# Patient Record
Sex: MALE | Race: AMERICAN INDIAN OR ALASKA NATIVE
[De-identification: names, ages, dates, MRNs, and addresses within clinical notes are randomized per-mention and may not be internally consistent; named-entity substitution may affect disease eponyms.]

---

## 2019-10-22 ENCOUNTER — HOSPITAL ENCOUNTER (INPATIENT)
Dept: HOSPITAL 5 - ED | Age: 34
LOS: 2 days | Discharge: HOME | DRG: 391 | End: 2019-10-24
Attending: INTERNAL MEDICINE | Admitting: INTERNAL MEDICINE
Payer: COMMERCIAL

## 2019-10-22 DIAGNOSIS — F17.210: ICD-10-CM

## 2019-10-22 DIAGNOSIS — E86.0: ICD-10-CM

## 2019-10-22 DIAGNOSIS — R19.7: Primary | ICD-10-CM

## 2019-10-22 DIAGNOSIS — N17.0: ICD-10-CM

## 2019-10-22 DIAGNOSIS — N39.0: ICD-10-CM

## 2019-10-22 DIAGNOSIS — E87.2: ICD-10-CM

## 2019-10-22 DIAGNOSIS — E87.1: ICD-10-CM

## 2019-10-22 DIAGNOSIS — B20: ICD-10-CM

## 2019-10-22 LAB
ALBUMIN SERPL-MCNC: 3.2 G/DL (ref 3.9–5)
ALT SERPL-CCNC: 17 UNITS/L (ref 7–56)
ANISOCYTOSIS BLD QL SMEAR: (no result)
BAND NEUTROPHILS # (MANUAL): 0 K/MM3
BILIRUB UR QL STRIP: (no result)
BLOOD UR QL VISUAL: (no result)
BUN SERPL-MCNC: 47 MG/DL (ref 9–20)
BUN/CREAT SERPL: 28 %
CALCIUM SERPL-MCNC: 8.5 MG/DL (ref 8.4–10.2)
HCT VFR BLD CALC: 48.6 % (ref 35.5–45.6)
HEMOLYSIS INDEX: 176
HGB BLD-MCNC: 15.9 GM/DL (ref 11.8–15.2)
INR PPP: 0.98 (ref 0.87–1.13)
MCHC RBC AUTO-ENTMCNC: 33 % (ref 32–34)
MCV RBC AUTO: 85 FL (ref 84–94)
MYELOCYTES # (MANUAL): 0 K/MM3
PH UR STRIP: 6 [PH] (ref 5–7)
PLATELET # BLD: 193 K/MM3 (ref 140–440)
POIKILOCYTOSIS BLD QL SMEAR: (no result)
PROMYELOCYTES # (MANUAL): 0 K/MM3
RBC # BLD AUTO: 5.74 M/MM3 (ref 3.65–5.03)
RBC #/AREA URNS HPF: 5 /HPF (ref 0–6)
TOTAL CELLS COUNTED BLD: 100
UROBILINOGEN UR-MCNC: < 2 MG/DL (ref ?–2)
WBC #/AREA URNS HPF: 10 /HPF (ref 0–6)

## 2019-10-22 PROCEDURE — 36415 COLL VENOUS BLD VENIPUNCTURE: CPT

## 2019-10-22 PROCEDURE — 87177 OVA AND PARASITES SMEARS: CPT

## 2019-10-22 PROCEDURE — 82550 ASSAY OF CK (CPK): CPT

## 2019-10-22 PROCEDURE — 96376 TX/PRO/DX INJ SAME DRUG ADON: CPT

## 2019-10-22 PROCEDURE — 74176 CT ABD & PELVIS W/O CONTRAST: CPT

## 2019-10-22 PROCEDURE — 76770 US EXAM ABDO BACK WALL COMP: CPT

## 2019-10-22 PROCEDURE — 87400 INFLUENZA A/B EACH AG IA: CPT

## 2019-10-22 PROCEDURE — 82570 ASSAY OF URINE CREATININE: CPT

## 2019-10-22 PROCEDURE — 87045 FECES CULTURE AEROBIC BACT: CPT

## 2019-10-22 PROCEDURE — 85007 BL SMEAR W/DIFF WBC COUNT: CPT

## 2019-10-22 PROCEDURE — 90686 IIV4 VACC NO PRSV 0.5 ML IM: CPT

## 2019-10-22 PROCEDURE — 82805 BLOOD GASES W/O2 SATURATION: CPT

## 2019-10-22 PROCEDURE — 71045 X-RAY EXAM CHEST 1 VIEW: CPT

## 2019-10-22 PROCEDURE — 81001 URINALYSIS AUTO W/SCOPE: CPT

## 2019-10-22 PROCEDURE — 84300 ASSAY OF URINE SODIUM: CPT

## 2019-10-22 PROCEDURE — 87086 URINE CULTURE/COLONY COUNT: CPT

## 2019-10-22 PROCEDURE — 84156 ASSAY OF PROTEIN URINE: CPT

## 2019-10-22 PROCEDURE — 83735 ASSAY OF MAGNESIUM: CPT

## 2019-10-22 PROCEDURE — 83690 ASSAY OF LIPASE: CPT

## 2019-10-22 PROCEDURE — 82140 ASSAY OF AMMONIA: CPT

## 2019-10-22 PROCEDURE — 96375 TX/PRO/DX INJ NEW DRUG ADDON: CPT

## 2019-10-22 PROCEDURE — 96374 THER/PROPH/DIAG INJ IV PUSH: CPT

## 2019-10-22 PROCEDURE — 93005 ELECTROCARDIOGRAM TRACING: CPT

## 2019-10-22 PROCEDURE — 85025 COMPLETE CBC W/AUTO DIFF WBC: CPT

## 2019-10-22 PROCEDURE — 80048 BASIC METABOLIC PNL TOTAL CA: CPT

## 2019-10-22 PROCEDURE — 93010 ELECTROCARDIOGRAM REPORT: CPT

## 2019-10-22 PROCEDURE — 85610 PROTHROMBIN TIME: CPT

## 2019-10-22 PROCEDURE — 80053 COMPREHEN METABOLIC PANEL: CPT

## 2019-10-22 PROCEDURE — 87040 BLOOD CULTURE FOR BACTERIA: CPT

## 2019-10-22 PROCEDURE — 87493 C DIFF AMPLIFIED PROBE: CPT

## 2019-10-22 RX ADMIN — Medication SCH ML: at 23:04

## 2019-10-22 RX ADMIN — Medication SCH ML: at 23:03

## 2019-10-22 NOTE — XRAY REPORT
CHEST 1 VIEW 



INDICATION / CLINICAL INFORMATION:

possible Sepsis.



COMPARISON: 

None available.



FINDINGS:



SUPPORT DEVICES: None.

HEART / MEDIASTINUM: No significant abnormality. 

LUNGS / PLEURA: No significant pulmonary or pleural abnormality. No pneumothorax. 



ADDITIONAL FINDINGS: No significant additional findings.



IMPRESSION:

No acute pulmonary or pleural abnormality



Signer Name: Noman Stubbs MD FACR 

Signed: 10/22/2019 3:18 PM

 Workstation Name: [id number]

## 2019-10-22 NOTE — EMERGENCY DEPARTMENT REPORT
ED Abdominal Pain HPI





- General


Chief Complaint: Abdominal Pain


Stated Complaint: POSS STOMACH VIRUS X 4DAYS


Time Seen by Provider: 10/22/19 15:21


Source: patient


Mode of arrival: Ambulatory


Limitations: No Limitations





- History of Present Illness


Initial Comments: 





33-year-old -American male patient with history of HIV presents today 

with complaints of abdominal pain and diarrhea 4 days.  He denies any upper 

respiratory symptoms including cough, congestion, shortness of breath, or chest 

pain.  He denies any fever.  Patient states he is having greater than 10 watery 

bowel movements a day.  Denies any hematochezia, black tarry stools, or urinary 

symptoms.  Patient states symptoms began after taking first dose of his new 

antivirals for his HIV-Biktarvy.  He states he has not taking any further 

antiviral since then.  He reports one episode of vomiting food contents.  Pain 

is generalized and worse with bowel movements.  She denies any previous history 

of abdominal surgeries, heavy alcohol drinking, or chronic GI issues.  Patient 

states his previous infectious disease doctor was in [place] and patient is unaware

of his current CD4 count or viral load.


-: Sudden


Location: diffuse


Radiation: none


Migration to: no migration


Severity: severe


Severity scale (0 -10): 8


Quality: cramping, aching


Consistency: constant


Improves With: nothing


Worsens With: bowel movement


Context: sick contacts (roommate with similar less severe symptoms, now 

resolved)


Associated Symptoms: nausea, vomiting, diarrhea, chills.  denies: fever, 

dysuria, hematemesis, hematochezia, hematuria, syncope





- Related Data


                                Home Medications











 Medication  Instructions  Recorded  Confirmed  Last Taken


 


Bictegrav/Emtricit/Tenofov Ala 1 each PO QDAY 10/22/19 10/22/19 10/18/19





[Biktarvy -25 mg (Nf)]    











                                    Allergies











Allergy/AdvReac Type Severity Reaction Status Date / Time


 


No Known Allergies Allergy   Unverified 10/22/19 14:01














ED Review of Systems


ROS: 


Stated complaint: POSS STOMACH VIRUS X 4DAYS


Other details as noted in HPI





Comment: All other systems reviewed and negative


Constitutional: see HPI, malaise.  denies: diaphoresis, fever, weakness


Respiratory: denies: cough, shortness of breath, wheezing


Cardiovascular: denies: chest pain, palpitations, dyspnea on exertion, syncope


Gastrointestinal: abdominal pain, nausea, vomiting, diarrhea.  denies: 

hematemesis, melena


Neurological: denies: headache, weakness, paresthesias


Hematological/Lymphatic: denies: easy bleeding





ED Past Medical Hx





- Past Medical History


Previous Medical History?: Yes


Hx HIV: Yes





- Surgical History


Past Surgical History?: No





- Social History


Smoking Status: Current Every Day Smoker


Substance Use Type: Marijuana





- Medications


Home Medications: 


                                Home Medications











 Medication  Instructions  Recorded  Confirmed  Last Taken  Type


 


Bictegrav/Emtricit/Tenofov Ala 1 each PO QDAY 10/22/19 10/22/19 10/18/19 History





[Biktarvy -25 mg (Nf)]     














ED Physical Exam





- General


Limitations: No Limitations


General appearance: alert, in no apparent distress





- Head


Head exam: Present: atraumatic





- Eye


Eye exam: Present: normal appearance





- ENT


ENT exam: Present: mucous membranes moist





- Neck


Neck exam: Present: normal inspection





- Respiratory


Respiratory exam: Present: normal lung sounds bilaterally.  Absent: respiratory 

distress





- Cardiovascular


Cardiovascular Exam: Present: normal rhythm, tachycardia, normal heart sounds





- GI/Abdominal


GI/Abdominal exam: Present: soft, tenderness (generalized, worse in LLQ ), 

normal bowel sounds.  Absent: distended, mass





- Extremities Exam


Extremities exam: Present: normal inspection





- Neurological Exam


Neurological exam: Present: alert, oriented X3





- Psychiatric


Psychiatric exam: Present: normal affect, normal mood





- Skin


Skin exam: Present: warm, dry, intact, normal color.  Absent: rash





ED Course


                                   Vital Signs











  10/22/19 10/22/19 10/22/19





  14:09 16:00 16:25


 


Temperature 97.5 F L  


 


Pulse Rate 138 H  118 H


 


Respiratory 18 18 18





Rate   


 


Blood Pressure 114/77  


 


Blood Pressure   105/63





[Left]   


 


O2 Sat by Pulse 100 97 97





Oximetry   














  10/22/19 10/22/19 10/22/19





  17:01 17:04 17:16


 


Temperature  98 F 


 


Pulse Rate 119 H 112 H 121 H


 


Respiratory 14 16 28 H





Rate   


 


Blood Pressure 112/80  112/80


 


Blood Pressure  112/80 





[Left]   


 


O2 Sat by Pulse  100 100





Oximetry   














  10/22/19 10/22/19 10/22/19





  17:30 19:00 19:02


 


Temperature   99.7 F H


 


Pulse Rate 122 H 121 H 116 H


 


Respiratory 18 14 20





Rate   


 


Blood Pressure 112/80 112/80 


 


Blood Pressure   107/73





[Left]   


 


O2 Sat by Pulse 67 L  100





Oximetry   














  10/22/19 10/22/19 10/22/19





  19:16 19:30 19:46


 


Temperature   


 


Pulse Rate 117 H 122 H 128 H


 


Respiratory 22 21 22





Rate   


 


Blood Pressure 107/73 107/73 119/85


 


Blood Pressure   





[Left]   


 


O2 Sat by Pulse   





Oximetry   














  10/22/19 10/22/19 10/22/19





  20:00 20:16 20:30


 


Temperature   


 


Pulse Rate 121 H 125 H 143 H


 


Respiratory 16 24 32 H





Rate   


 


Blood Pressure 119/85 111/78 111/78


 


Blood Pressure   





[Left]   


 


O2 Sat by Pulse  100 100





Oximetry   














  10/22/19 10/22/19 10/22/19





  20:46 21:01 21:15


 


Temperature   


 


Pulse Rate 130 H  121 H


 


Respiratory 26 H  39 H





Rate   


 


Blood Pressure 124/82 112/80 124/82


 


Blood Pressure   





[Left]   


 


O2 Sat by Pulse 99 100 100





Oximetry   














  10/22/19 10/22/19 10/22/19





  21:29 21:31 21:41


 


Temperature 98.1 F  


 


Pulse Rate 111 H 129 H 123 H


 


Respiratory 20 33 H 17





Rate   


 


Blood Pressure  121/79 121/79


 


Blood Pressure   





[Left]   


 


O2 Sat by Pulse  97 98





Oximetry   














  10/22/19 10/22/19 10/22/19





  21:51 22:01 22:11


 


Temperature   


 


Pulse Rate 131 H 130 H 130 H


 


Respiratory 29 H 24 24





Rate   


 


Blood Pressure 114/79 96/68 96/68


 


Blood Pressure   





[Left]   


 


O2 Sat by Pulse 100 100 100





Oximetry   














  10/22/19 10/22/19 10/22/19





  22:21 22:30 22:41


 


Temperature   


 


Pulse Rate 131 H 129 H 130 H


 


Respiratory 22 24 22





Rate   


 


Blood Pressure 96/68 94/59 94/59


 


Blood Pressure   





[Left]   


 


O2 Sat by Pulse 100 100 100





Oximetry   














  10/22/19 10/22/19 10/22/19





  22:51 23:01 23:10


 


Temperature   


 


Pulse Rate 146 H  


 


Respiratory 26 H  





Rate   


 


Blood Pressure 94/59 94/59 94/59


 


Blood Pressure   





[Left]   


 


O2 Sat by Pulse 100 88 100





Oximetry   














- Reevaluation(s)


Reevaluation #1: 





10/22/19 15:42


Patient presents with greater than 10 watery bowel movements daily for 4 days 

and abdominal pain


Heart rate is at 1:30suspect dehydration


Sepsis protocol initiatedinitial lactic acid 4.8





Reevaluation #2: 





10/22/19 17:09


CT abdomen still pending.  Patient remained afebrile.  Discussed patient with 

Dr. Wooten, infectious disease.  He does not recommend antibiotics at this time.  

As recommend stool testing for cryptosporidium antigen and C. difficile.  Dr. Wooten states he will consult on patient in the morning.





ED Medical Decision Making





- Lab Data


Result diagrams: 


                                 10/23/19 06:42





                                 10/24/19 05:02








                                   Lab Results











  10/22/19 10/22/19 10/22/19 Range/Units





  14:24 14:24 14:24 


 


WBC  10.6    (4.5-11.0)  K/mm3


 


RBC  5.74 H    (3.65-5.03)  M/mm3


 


Hgb  15.9 H    (11.8-15.2)  gm/dl


 


Hct  48.6 H    (35.5-45.6)  %


 


MCV  85    (84-94)  fl


 


MCH  28    (28-32)  pg


 


MCHC  33    (32-34)  %


 


RDW  15.0    (13.2-15.2)  %


 


Plt Count  193    (140-440)  K/mm3


 


Add Manual Diff  Complete    


 


Total Counted  100    


 


Seg Neuts % (Manual)  63.0    (40.0-70.0)  %


 


Band Neutrophils %  0    %


 


Lymphocytes % (Manual)  25.0    (13.4-35.0)  %


 


Reactive Lymphs % (Man)  0    %


 


Monocytes % (Manual)  12.0 H    (0.0-7.3)  %


 


Eosinophils % (Manual)  0    (0.0-4.3)  %


 


Basophils % (Manual)  0    (0.0-1.8)  %


 


Metamyelocytes %  0    %


 


Myelocytes %  0    %


 


Promyelocytes %  0    %


 


Blast Cells %  0    %


 


Nucleated RBC %  Not Reportable    


 


Seg Neutrophils # Man  6.7    (1.8-7.7)  K/mm3


 


Band Neutrophils #  0.0    K/mm3


 


Lymphocytes # (Manual)  2.7    (1.2-5.4)  K/mm3


 


Abs React Lymphs (Man)  0.0    K/mm3


 


Monocytes # (Manual)  1.3 H    (0.0-0.8)  K/mm3


 


Eosinophils # (Manual)  0.0    (0.0-0.4)  K/mm3


 


Basophils # (Manual)  0.0    (0.0-0.1)  K/mm3


 


Metamyelocytes #  0.0    K/mm3


 


Myelocytes #  0.0    K/mm3


 


Promyelocytes #  0.0    K/mm3


 


Blast Cells #  0.0    K/mm3


 


WBC Morphology  Not Reportable    


 


Hypersegmented Neuts  Not Reportable    


 


Hyposegmented Neuts  Not Reportable    


 


Hypogranular Neuts  Not Reportable    


 


Smudge Cells  Not Reportable    


 


Toxic Granulation  Not Reportable    


 


Toxic Vacuolation  Not Reportable    


 


Dohle Bodies  Not Reportable    


 


Pelger-Huet Anomaly  Not Reportable    


 


Robert Rods  Not Reportable    


 


Platelet Estimate  Consistent w auto    


 


Clumped Platelets  Not Reportable    


 


Plt Clumps, EDTA  Not Reportable    


 


Large Platelets  Not Reportable    


 


Giant Platelets  Not Reportable    


 


Platelet Satelliting  Not Reportable    


 


Plt Morphology Comment  Not Reportable    


 


RBC Morphology  Not Reportable    


 


Dimorphic RBCs  Not Reportable    


 


Polychromasia  Not Reportable    


 


Hypochromasia  Not Reportable    


 


Poikilocytosis  1+    


 


Anisocytosis  1+    


 


Microcytosis  Not Reportable    


 


Macrocytosis  Not Reportable    


 


Spherocytes  Not Reportable    


 


Pappenheimer Bodies  Not Reportable    


 


Sickle Cells  Not Reportable    


 


Target Cells  Few    


 


Tear Drop Cells  Not Reportable    


 


Ovalocytes  Rare    


 


Helmet Cells  Not Reportable    


 


Hansen-[place] Bodies  Not Reportable    


 


Cabot Rings  Not Reportable    


 


[place] Cells  Not Reportable    


 


Bite Cells  Not Reportable    


 


Crenated Cell  Not Reportable    


 


Elliptocytes  Not Reportable    


 


Acanthocytes (Spur)  Not Reportable    


 


Rouleaux  Not Reportable    


 


Hemoglobin C Crystals  Not Reportable    


 


Schistocytes  Not Reportable    


 


Malaria parasites  Not Reportable    


 


Brandon Bodies  Not Reportable    


 


Hem Pathologist Commnt  No    


 


PT   12.7   (12.2-14.9)  Sec.


 


INR   0.98   (0.87-1.13)  


 


VBG pH     (7.320-7.420)  


 


Sodium    129 L  (137-145)  mmol/L


 


Potassium    4.6  (3.6-5.0)  mmol/L


 


Chloride    92.0 L  ()  mmol/L


 


Carbon Dioxide    20 L  (22-30)  mmol/L


 


Anion Gap    22  mmol/L


 


BUN    47 H  (9-20)  mg/dL


 


Creatinine    1.7 H  (0.8-1.5)  mg/dL


 


Estimated GFR    56  ml/min


 


BUN/Creatinine Ratio    28  %


 


Glucose    113 H  ()  mg/dL


 


Lactic Acid     (0.7-2.0)  mmol/L


 


Calcium    8.5  (8.4-10.2)  mg/dL


 


Magnesium     (1.7-2.3)  mg/dL


 


Total Bilirubin    1.00  (0.1-1.2)  mg/dL


 


AST    52 H  (5-40)  units/L


 


ALT    17  (7-56)  units/L


 


Alkaline Phosphatase    42  ()  units/L


 


Total Creatine Kinase     ()  units/L


 


Total Protein    7.5  (6.3-8.2)  g/dL


 


Albumin    3.2 L  (3.9-5)  g/dL


 


Albumin/Globulin Ratio    0.7  %


 


Lipase     (13-60)  units/L


 


C. difficile Tox (PCR)     (Negative)  


 


Influenza A (Rapid)     (Negative)  


 


Influenza B (Rapid)     (Negative)  














  10/22/19 10/22/19 10/22/19 Range/Units





  14:24 14:24 16:16 


 


WBC     (4.5-11.0)  K/mm3


 


RBC     (3.65-5.03)  M/mm3


 


Hgb     (11.8-15.2)  gm/dl


 


Hct     (35.5-45.6)  %


 


MCV     (84-94)  fl


 


MCH     (28-32)  pg


 


MCHC     (32-34)  %


 


RDW     (13.2-15.2)  %


 


Plt Count     (140-440)  K/mm3


 


Add Manual Diff     


 


Total Counted     


 


Seg Neuts % (Manual)     (40.0-70.0)  %


 


Band Neutrophils %     %


 


Lymphocytes % (Manual)     (13.4-35.0)  %


 


Reactive Lymphs % (Man)     %


 


Monocytes % (Manual)     (0.0-7.3)  %


 


Eosinophils % (Manual)     (0.0-4.3)  %


 


Basophils % (Manual)     (0.0-1.8)  %


 


Metamyelocytes %     %


 


Myelocytes %     %


 


Promyelocytes %     %


 


Blast Cells %     %


 


Nucleated RBC %     


 


Seg Neutrophils # Man     (1.8-7.7)  K/mm3


 


Band Neutrophils #     K/mm3


 


Lymphocytes # (Manual)     (1.2-5.4)  K/mm3


 


Abs React Lymphs (Man)     K/mm3


 


Monocytes # (Manual)     (0.0-0.8)  K/mm3


 


Eosinophils # (Manual)     (0.0-0.4)  K/mm3


 


Basophils # (Manual)     (0.0-0.1)  K/mm3


 


Metamyelocytes #     K/mm3


 


Myelocytes #     K/mm3


 


Promyelocytes #     K/mm3


 


Blast Cells #     K/mm3


 


WBC Morphology     


 


Hypersegmented Neuts     


 


Hyposegmented Neuts     


 


Hypogranular Neuts     


 


Smudge Cells     


 


Toxic Granulation     


 


Toxic Vacuolation     


 


Dohle Bodies     


 


Pelger-Huet Anomaly     


 


Robert Rods     


 


Platelet Estimate     


 


Clumped Platelets     


 


Plt Clumps, EDTA     


 


Large Platelets     


 


Giant Platelets     


 


Platelet Satelliting     


 


Plt Morphology Comment     


 


RBC Morphology     


 


Dimorphic RBCs     


 


Polychromasia     


 


Hypochromasia     


 


Poikilocytosis     


 


Anisocytosis     


 


Microcytosis     


 


Macrocytosis     


 


Spherocytes     


 


Pappenheimer Bodies     


 


Sickle Cells     


 


Target Cells     


 


Tear Drop Cells     


 


Ovalocytes     


 


Helmet Cells     


 


Hansen-[place] Bodies     


 


Cabot Rings     


 


[place] Cells     


 


Bite Cells     


 


Crenated Cell     


 


Elliptocytes     


 


Acanthocytes (Spur)     


 


Rouleaux     


 


Hemoglobin C Crystals     


 


Schistocytes     


 


Malaria parasites     


 


Brandon Bodies     


 


Hem Pathologist Commnt     


 


PT     (12.2-14.9)  Sec.


 


INR     (0.87-1.13)  


 


VBG pH   7.383   (7.320-7.420)  


 


Sodium     (137-145)  mmol/L


 


Potassium     (3.6-5.0)  mmol/L


 


Chloride     ()  mmol/L


 


Carbon Dioxide     (22-30)  mmol/L


 


Anion Gap     mmol/L


 


BUN     (9-20)  mg/dL


 


Creatinine     (0.8-1.5)  mg/dL


 


Estimated GFR     ml/min


 


BUN/Creatinine Ratio     %


 


Glucose     ()  mg/dL


 


Lactic Acid  4.80 H*    (0.7-2.0)  mmol/L


 


Calcium     (8.4-10.2)  mg/dL


 


Magnesium     (1.7-2.3)  mg/dL


 


Total Bilirubin     (0.1-1.2)  mg/dL


 


AST     (5-40)  units/L


 


ALT     (7-56)  units/L


 


Alkaline Phosphatase     ()  units/L


 


Total Creatine Kinase     ()  units/L


 


Total Protein     (6.3-8.2)  g/dL


 


Albumin     (3.9-5)  g/dL


 


Albumin/Globulin Ratio     %


 


Lipase     (13-60)  units/L


 


C. difficile Tox (PCR)     (Negative)  


 


Influenza A (Rapid)    Negative  (Negative)  


 


Influenza B (Rapid)    Negative  (Negative)  














  10/22/19 10/22/19 10/22/19 Range/Units





  16:21 16:21 16:34 


 


WBC     (4.5-11.0)  K/mm3


 


RBC     (3.65-5.03)  M/mm3


 


Hgb     (11.8-15.2)  gm/dl


 


Hct     (35.5-45.6)  %


 


MCV     (84-94)  fl


 


MCH     (28-32)  pg


 


MCHC     (32-34)  %


 


RDW     (13.2-15.2)  %


 


Plt Count     (140-440)  K/mm3


 


Add Manual Diff     


 


Total Counted     


 


Seg Neuts % (Manual)     (40.0-70.0)  %


 


Band Neutrophils %     %


 


Lymphocytes % (Manual)     (13.4-35.0)  %


 


Reactive Lymphs % (Man)     %


 


Monocytes % (Manual)     (0.0-7.3)  %


 


Eosinophils % (Manual)     (0.0-4.3)  %


 


Basophils % (Manual)     (0.0-1.8)  %


 


Metamyelocytes %     %


 


Myelocytes %     %


 


Promyelocytes %     %


 


Blast Cells %     %


 


Nucleated RBC %     


 


Seg Neutrophils # Man     (1.8-7.7)  K/mm3


 


Band Neutrophils #     K/mm3


 


Lymphocytes # (Manual)     (1.2-5.4)  K/mm3


 


Abs React Lymphs (Man)     K/mm3


 


Monocytes # (Manual)     (0.0-0.8)  K/mm3


 


Eosinophils # (Manual)     (0.0-0.4)  K/mm3


 


Basophils # (Manual)     (0.0-0.1)  K/mm3


 


Metamyelocytes #     K/mm3


 


Myelocytes #     K/mm3


 


Promyelocytes #     K/mm3


 


Blast Cells #     K/mm3


 


WBC Morphology     


 


Hypersegmented Neuts     


 


Hyposegmented Neuts     


 


Hypogranular Neuts     


 


Smudge Cells     


 


Toxic Granulation     


 


Toxic Vacuolation     


 


Dohle Bodies     


 


Pelger-Huet Anomaly     


 


Robert Rods     


 


Platelet Estimate     


 


Clumped Platelets     


 


Plt Clumps, EDTA     


 


Large Platelets     


 


Giant Platelets     


 


Platelet Satelliting     


 


Plt Morphology Comment     


 


RBC Morphology     


 


Dimorphic RBCs     


 


Polychromasia     


 


Hypochromasia     


 


Poikilocytosis     


 


Anisocytosis     


 


Microcytosis     


 


Macrocytosis     


 


Spherocytes     


 


Pappenheimer Bodies     


 


Sickle Cells     


 


Target Cells     


 


Tear Drop Cells     


 


Ovalocytes     


 


Helmet Cells     


 


Hansen-[place] Bodies     


 


Cabot Rings     


 


Roula Cells     


 


Bite Cells     


 


Crenated Cell     


 


Elliptocytes     


 


Acanthocytes (Spur)     


 


Rouleaux     


 


Hemoglobin C Crystals     


 


Schistocytes     


 


Malaria parasites     


 


Brandon Bodies     


 


Hem Pathologist Commnt     


 


PT     (12.2-14.9)  Sec.


 


INR     (0.87-1.13)  


 


VBG pH     (7.320-7.420)  


 


Sodium     (137-145)  mmol/L


 


Potassium     (3.6-5.0)  mmol/L


 


Chloride     ()  mmol/L


 


Carbon Dioxide     (22-30)  mmol/L


 


Anion Gap     mmol/L


 


BUN     (9-20)  mg/dL


 


Creatinine     (0.8-1.5)  mg/dL


 


Estimated GFR     ml/min


 


BUN/Creatinine Ratio     %


 


Glucose     ()  mg/dL


 


Lactic Acid   4.30 H*   (0.7-2.0)  mmol/L


 


Calcium     (8.4-10.2)  mg/dL


 


Magnesium    1.80  (1.7-2.3)  mg/dL


 


Total Bilirubin     (0.1-1.2)  mg/dL


 


AST     (5-40)  units/L


 


ALT     (7-56)  units/L


 


Alkaline Phosphatase     ()  units/L


 


Total Creatine Kinase    191 H  ()  units/L


 


Total Protein     (6.3-8.2)  g/dL


 


Albumin     (3.9-5)  g/dL


 


Albumin/Globulin Ratio     %


 


Lipase  7 L    (13-60)  units/L


 


C. difficile Tox (PCR)     (Negative)  


 


Influenza A (Rapid)     (Negative)  


 


Influenza B (Rapid)     (Negative)  














  10/22/19 10/22/19 10/22/19 Range/Units





  16:45 17:22 19:55 


 


WBC     (4.5-11.0)  K/mm3


 


RBC     (3.65-5.03)  M/mm3


 


Hgb     (11.8-15.2)  gm/dl


 


Hct     (35.5-45.6)  %


 


MCV     (84-94)  fl


 


MCH     (28-32)  pg


 


MCHC     (32-34)  %


 


RDW     (13.2-15.2)  %


 


Plt Count     (140-440)  K/mm3


 


Add Manual Diff     


 


Total Counted     


 


Seg Neuts % (Manual)     (40.0-70.0)  %


 


Band Neutrophils %     %


 


Lymphocytes % (Manual)     (13.4-35.0)  %


 


Reactive Lymphs % (Man)     %


 


Monocytes % (Manual)     (0.0-7.3)  %


 


Eosinophils % (Manual)     (0.0-4.3)  %


 


Basophils % (Manual)     (0.0-1.8)  %


 


Metamyelocytes %     %


 


Myelocytes %     %


 


Promyelocytes %     %


 


Blast Cells %     %


 


Nucleated RBC %     


 


Seg Neutrophils # Man     (1.8-7.7)  K/mm3


 


Band Neutrophils #     K/mm3


 


Lymphocytes # (Manual)     (1.2-5.4)  K/mm3


 


Abs React Lymphs (Man)     K/mm3


 


Monocytes # (Manual)     (0.0-0.8)  K/mm3


 


Eosinophils # (Manual)     (0.0-0.4)  K/mm3


 


Basophils # (Manual)     (0.0-0.1)  K/mm3


 


Metamyelocytes #     K/mm3


 


Myelocytes #     K/mm3


 


Promyelocytes #     K/mm3


 


Blast Cells #     K/mm3


 


WBC Morphology     


 


Hypersegmented Neuts     


 


Hyposegmented Neuts     


 


Hypogranular Neuts     


 


Smudge Cells     


 


Toxic Granulation     


 


Toxic Vacuolation     


 


Dohle Bodies     


 


Pelger-Huet Anomaly     


 


Robert Rods     


 


Platelet Estimate     


 


Clumped Platelets     


 


Plt Clumps, EDTA     


 


Large Platelets     


 


Giant Platelets     


 


Platelet Satelliting     


 


Plt Morphology Comment     


 


RBC Morphology     


 


Dimorphic RBCs     


 


Polychromasia     


 


Hypochromasia     


 


Poikilocytosis     


 


Anisocytosis     


 


Microcytosis     


 


Macrocytosis     


 


Spherocytes     


 


Pappenheimer Bodies     


 


Sickle Cells     


 


Target Cells     


 


Tear Drop Cells     


 


Ovalocytes     


 


Helmet Cells     


 


Hansen-[place] Bodies     


 


Cabot Rings     


 


[place] Cells     


 


Bite Cells     


 


Crenated Cell     


 


Elliptocytes     


 


Acanthocytes (Spur)     


 


Rouleaux     


 


Hemoglobin C Crystals     


 


Schistocytes     


 


Malaria parasites     


 


Brandon Bodies     


 


Hem Pathologist Commnt     


 


PT     (12.2-14.9)  Sec.


 


INR     (0.87-1.13)  


 


VBG pH     (7.320-7.420)  


 


Sodium     (137-145)  mmol/L


 


Potassium     (3.6-5.0)  mmol/L


 


Chloride     ()  mmol/L


 


Carbon Dioxide     (22-30)  mmol/L


 


Anion Gap     mmol/L


 


BUN     (9-20)  mg/dL


 


Creatinine     (0.8-1.5)  mg/dL


 


Estimated GFR     ml/min


 


BUN/Creatinine Ratio     %


 


Glucose     ()  mg/dL


 


Lactic Acid   4.60 H*  3.90 H*  (0.7-2.0)  mmol/L


 


Calcium     (8.4-10.2)  mg/dL


 


Magnesium     (1.7-2.3)  mg/dL


 


Total Bilirubin     (0.1-1.2)  mg/dL


 


AST     (5-40)  units/L


 


ALT     (7-56)  units/L


 


Alkaline Phosphatase     ()  units/L


 


Total Creatine Kinase     ()  units/L


 


Total Protein     (6.3-8.2)  g/dL


 


Albumin     (3.9-5)  g/dL


 


Albumin/Globulin Ratio     %


 


Lipase     (13-60)  units/L


 


C. difficile Tox (PCR)  Negative    (Negative)  


 


Influenza A (Rapid)     (Negative)  


 


Influenza B (Rapid)     (Negative)  














  10/22/19 10/22/19 Range/Units





  19:55 20:35 


 


WBC    (4.5-11.0)  K/mm3


 


RBC    (3.65-5.03)  M/mm3


 


Hgb    (11.8-15.2)  gm/dl


 


Hct    (35.5-45.6)  %


 


MCV    (84-94)  fl


 


MCH    (28-32)  pg


 


MCHC    (32-34)  %


 


RDW    (13.2-15.2)  %


 


Plt Count    (140-440)  K/mm3


 


Add Manual Diff    


 


Total Counted    


 


Seg Neuts % (Manual)    (40.0-70.0)  %


 


Band Neutrophils %    %


 


Lymphocytes % (Manual)    (13.4-35.0)  %


 


Reactive Lymphs % (Man)    %


 


Monocytes % (Manual)    (0.0-7.3)  %


 


Eosinophils % (Manual)    (0.0-4.3)  %


 


Basophils % (Manual)    (0.0-1.8)  %


 


Metamyelocytes %    %


 


Myelocytes %    %


 


Promyelocytes %    %


 


Blast Cells %    %


 


Nucleated RBC %    


 


Seg Neutrophils # Man    (1.8-7.7)  K/mm3


 


Band Neutrophils #    K/mm3


 


Lymphocytes # (Manual)    (1.2-5.4)  K/mm3


 


Abs React Lymphs (Man)    K/mm3


 


Monocytes # (Manual)    (0.0-0.8)  K/mm3


 


Eosinophils # (Manual)    (0.0-0.4)  K/mm3


 


Basophils # (Manual)    (0.0-0.1)  K/mm3


 


Metamyelocytes #    K/mm3


 


Myelocytes #    K/mm3


 


Promyelocytes #    K/mm3


 


Blast Cells #    K/mm3


 


WBC Morphology    


 


Hypersegmented Neuts    


 


Hyposegmented Neuts    


 


Hypogranular Neuts    


 


Smudge Cells    


 


Toxic Granulation    


 


Toxic Vacuolation    


 


Dohle Bodies    


 


Pelger-Huet Anomaly    


 


Robert Rods    


 


Platelet Estimate    


 


Clumped Platelets    


 


Plt Clumps, EDTA    


 


Large Platelets    


 


Giant Platelets    


 


Platelet Satelliting    


 


Plt Morphology Comment    


 


RBC Morphology    


 


Dimorphic RBCs    


 


Polychromasia    


 


Hypochromasia    


 


Poikilocytosis    


 


Anisocytosis    


 


Microcytosis    


 


Macrocytosis    


 


Spherocytes    


 


Pappenheimer Bodies    


 


Sickle Cells    


 


Target Cells    


 


Tear Drop Cells    


 


Ovalocytes    


 


Helmet Cells    


 


Hansen-[place] Bodies    


 


Cabot Rings    


 


Roula Cells    


 


Bite Cells    


 


Crenated Cell    


 


Elliptocytes    


 


Acanthocytes (Spur)    


 


Rouleaux    


 


Hemoglobin C Crystals    


 


Schistocytes    


 


Malaria parasites    


 


Brandon Bodies    


 


Hem Pathologist Commnt    


 


PT    (12.2-14.9)  Sec.


 


INR    (0.87-1.13)  


 


VBG pH    (7.320-7.420)  


 


Sodium    (137-145)  mmol/L


 


Potassium    (3.6-5.0)  mmol/L


 


Chloride    ()  mmol/L


 


Carbon Dioxide    (22-30)  mmol/L


 


Anion Gap    mmol/L


 


BUN    (9-20)  mg/dL


 


Creatinine    (0.8-1.5)  mg/dL


 


Estimated GFR    ml/min


 


BUN/Creatinine Ratio    %


 


Glucose    ()  mg/dL


 


Lactic Acid  4.00 H*  4.20 H*  (0.7-2.0)  mmol/L


 


Calcium    (8.4-10.2)  mg/dL


 


Magnesium    (1.7-2.3)  mg/dL


 


Total Bilirubin    (0.1-1.2)  mg/dL


 


AST    (5-40)  units/L


 


ALT    (7-56)  units/L


 


Alkaline Phosphatase    ()  units/L


 


Total Creatine Kinase    ()  units/L


 


Total Protein    (6.3-8.2)  g/dL


 


Albumin    (3.9-5)  g/dL


 


Albumin/Globulin Ratio    %


 


Lipase    (13-60)  units/L


 


C. difficile Tox (PCR)    (Negative)  


 


Influenza A (Rapid)    (Negative)  


 


Influenza B (Rapid)    (Negative)  














- EKG Data


EKG shows normal: sinus rhythm


Rate: tachycardia





- Radiology Data


Radiology results: report reviewed





Chest x-ray shows no acute findings.


.





 CT ABDOMEN AND PELVIS WITHOUT CONTRAST 





INDICATION / CLINICAL INFORMATION: 


abdominal pain, worse in LLQ,diarrhea, HIV. 





TECHNIQUE: 


Axial CT images were obtained through the abdomen and pelvis without IV 

contrast. All CT scans at 


 this location are performed using CT dose reduction for ALARA by means of 

automated exposure 


 control. 





COMPARISON: 


None available. 





FINDINGS: 





Lack of oral and IV contrast limits diagnostic accuracy in patient with minimum 

intra-abdominal fat 








LOWER CHEST: No significant abnormality. 


LIVER: No significant abnormality. 


GALLBLADDER: No significant abnormality. 


BILE DUCTS: No significant abnormality. 


PANCREAS: No significant abnormality. 


SPLEEN: No significant abnormality. 


ADRENALS: No significant abnormality. 


RIGHT KIDNEY and URETER: No significant abnormality. 


LEFT KIDNEY and URETER: Several punctate calcifications are present left kidney 





STOMACH and SMALL BOWEL: No significant abnormality. 


COLON: No significant abnormality. 


APPENDIX: No significant abnormality. 


PERITONEUM: No free fluid. No free air. No fluid collection. 


LYMPH NODES: No significant adenopathy. 


AORTA and ARTERIES: No significant abnormality. 


IVC and VEINS: No significant abnormality. 





URINARY BLADDER: No significant abnormality. 


REPRODUCTIVE ORGANS: No significant abnormality. 





ADDITIONAL FINDINGS: None. 





SKELETAL SYSTEM: No significant abnormality. 





IMPRESSION: 


1. Left nephrolithiasis 








- Medical Decision Making





33-year-old male patient with history of HIV.  Her with watery diarrhea 

abdominal pain.  Positive sepsis.  Normal.  Discussed patient with Dr. Wooten, 

[place] disease-no antibiotics recommended at this time.  Elevated creatinine at 

1.7 noted on GFR 56. Pt to be admitted for further evaluation pending CT of 

abdomen.


Critical care attestation.: 


If time is entered above; I have spent that time in minutes in the direct care 

of this critically ill patient, excluding procedure time.








ED Disposition


Clinical Impression: 


 Abdominal pain, vomiting, and diarrhea





Sepsis


Qualifiers:


 Sepsis type: sepsis due to unspecified organism Sepsis acute organ dysfunction 

status: unspecified Qualified Code(s): A41.9 - Sepsis, unspecified organism





Disposition: DC-09 OP ADMIT IP TO THIS HOSP


Is pt being admited?: Yes


Condition: Fair

## 2019-10-22 NOTE — HISTORY AND PHYSICAL REPORT
History of Present Illness


Date of examination: 10/22/19


Date of admission: 


10/22/19 20:54





History of present illness: 


33-year-old man with a history of HIV, unknown CD4 count for comes comes to the 

emergency room with complaints of abdominal pain and diarrhea 4 days.  Patient 

stated he was on HAART but discontinue for 7 months because he was going through

a period of depression, he recently started on new medication 1-2 weeks ago, he 

cannot recall the name.  His abdominal pain is all over his abdomen described as

sharp, intermittent, only when he has diarrhea.  Has 12-13 episodes of nonbloody

diarrhea today, no recent antibiotic, no fever or chills.  Admits to decreased 

oral intake, generalized weakness, one episode of vomiting


eview Of  Systems:


Constitutional: no weight loss, fever, chills


Ears, eyes, nose, mouth and throat: no nasal congestion, no nasal discharge, no 

sinus pressure, blurry vision, diplopia


Neck: No neck pain or rigidity.


Cardiovascular: No  palpitations, chest pain


Respiratory: No shortness of breath, cough


Gastrointestinal: No hematochezia


Genitourinary : no dysuria, frequency , hematuria


Musculoskeletal: no muscle ache , joint pain


Integumentary: no rash, no pruritis


Neurological: no parathesias, focal weakness


Endocrine: no cold or heat intolerance, no polyuria or polydipsia


Hematologic/Lymphatic: no easy bruising, no easy bleeding, no gland swelling


Allergic/Immunologic: no urticaria, no angioedema.





PAST MEDICAL HISTORY:HIV





PAST SURGICAL HISTORY:none





FAMILY HISTORY:hypertension, diabetes





SOCIAL HISTORY: , marijuana,  +alcohol





Medications and Allergies


                                    Allergies











Allergy/AdvReac Type Severity Reaction Status Date / Time


 


No Known Allergies Allergy   Unverified 10/22/19 14:01











                                Home Medications











 Medication  Instructions  Recorded  Confirmed  Last Taken  Type


 


Bictegrav/Emtricit/Tenofov Ala 1 each PO QDAY 10/22/19 10/22/19 10/18/19 History





[Biktarvy -25 mg (Nf)]     











Active Meds: 


Active Medications





Acetaminophen (Tylenol)  650 mg PO Q4H PRN


   PRN Reason: Pain MILD(1-3)/Fever >100.5/HA


Acetaminophen (Tylenol)  650 mg PO Q4H PRN


   PRN Reason: Pain MILD(1-3)/Fever >100.5/HA


Enoxaparin Sodium (Lovenox)  30 mg SUB-Q QDAY OJSSIE


Sodium Chloride (Nacl 0.9% 1000 Ml)  1,000 mls @ 999 mls/hr IV BOLUS ONE


   Stop: 10/22/19 22:31


   Last Admin: 10/22/19 21:38 Dose:  999 mls/hr


   Documented by: 


Sodium Chloride (Nacl 0.9% 1000 Ml)  1,000 mls @ 150 mls/hr IV AS DIRECT JOSSIE


Sodium Chloride (Nacl 0.9% 1000 Ml)  1,000 mls @ 999 mls/hr IV ONCE ONE


   Stop: 10/22/19 22:52


Miscellaneous Medication (Bictegrav/Emtricit/Tenofov Ala)  1 each PO QDAY JOSSIE


Morphine Sulfate (Morphine)  2 mg IV Q4H PRN


   PRN Reason: Pain, Moderate (4-6)


Ondansetron HCl (Zofran)  4 mg IV Q8H PRN


   PRN Reason: Nausea And Vomiting


Ondansetron HCl (Zofran)  4 mg IV Q4H PRN


   PRN Reason: Nausea And Vomiting


Sodium Chloride (Sodium Chloride Flush Syringe 10 Ml)  10 ml IV BID JOSSIE


Sodium Chloride (Sodium Chloride Flush Syringe 10 Ml)  10 ml IV PRN PRN


   PRN Reason: LINE FLUSH


Sodium Chloride (Sodium Chloride Flush Syringe 10 Ml)  10 ml IV BID JOSSIE


Sodium Chloride (Sodium Chloride Flush Syringe 10 Ml)  10 ml IV PRN PRN


   PRN Reason: LINE FLUSH











Exam





- Physical Exam


Narrative exam: 


General Apperance: The patient sitting in bed no acute distress


HEENT: Normocephalic, atraumatic.  Pupils equally round and reactive to light, 

extraocular movement intact, and no sclericterus or JVD or thyromegaly or 

nodule.  Neck supple, no carotid bruit, mucous membranes dry, no exudate or 

erythema


Heart: S1-S2, regular is rhythm


Lungs: Clear to auscultation bilaterally, breathing comfortable


Abdomen: Positive bowel sounds, soft, nontender, nondistended, no organomegaly


Extremities: No edema cyanosis clubbing


Skin: no rash, nodule, warm and dry


Neuro:CN 2 -12 intact, motor/sensory intact, speech is fluent








- Constitutional


Vitals: 


                                        











Temp Pulse Resp BP Pulse Ox


 


 98.1 F   111 H  20   124/82   100 


 


 10/22/19 21:29  10/22/19 21:29  10/22/19 21:29  10/22/19 21:15  10/22/19 21:15














Results





- Labs


CBC & Chem 7: 


                                 10/23/19 06:42





                                 10/23/19 06:42


Labs: 


                              Abnormal lab results











  10/22/19 10/22/19 10/22/19 Range/Units





  14:24 14:24 14:24 


 


RBC  5.74 H    (3.65-5.03)  M/mm3


 


Hgb  15.9 H    (11.8-15.2)  gm/dl


 


Hct  48.6 H    (35.5-45.6)  %


 


Monocytes % (Manual)  12.0 H    (0.0-7.3)  %


 


Monocytes # (Manual)  1.3 H    (0.0-0.8)  K/mm3


 


Sodium   129 L   (137-145)  mmol/L


 


Chloride   92.0 L   ()  mmol/L


 


Carbon Dioxide   20 L   (22-30)  mmol/L


 


BUN   47 H   (9-20)  mg/dL


 


Creatinine   1.7 H   (0.8-1.5)  mg/dL


 


Glucose   113 H   ()  mg/dL


 


Lactic Acid    4.80 H*  (0.7-2.0)  mmol/L


 


AST   52 H   (5-40)  units/L


 


Total Creatine Kinase     ()  units/L


 


Albumin   3.2 L   (3.9-5)  g/dL


 


Lipase     (13-60)  units/L


 


Urine WBC (Auto)     (0.0-6.0)  /HPF














  10/22/19 10/22/19 10/22/19 Range/Units





  16:21 16:21 16:34 


 


RBC     (3.65-5.03)  M/mm3


 


Hgb     (11.8-15.2)  gm/dl


 


Hct     (35.5-45.6)  %


 


Monocytes % (Manual)     (0.0-7.3)  %


 


Monocytes # (Manual)     (0.0-0.8)  K/mm3


 


Sodium     (137-145)  mmol/L


 


Chloride     ()  mmol/L


 


Carbon Dioxide     (22-30)  mmol/L


 


BUN     (9-20)  mg/dL


 


Creatinine     (0.8-1.5)  mg/dL


 


Glucose     ()  mg/dL


 


Lactic Acid   4.30 H*   (0.7-2.0)  mmol/L


 


AST     (5-40)  units/L


 


Total Creatine Kinase    191 H  ()  units/L


 


Albumin     (3.9-5)  g/dL


 


Lipase  7 L    (13-60)  units/L


 


Urine WBC (Auto)     (0.0-6.0)  /HPF














  10/22/19 10/22/19 10/22/19 Range/Units





  17:22 19:55 19:55 


 


RBC     (3.65-5.03)  M/mm3


 


Hgb     (11.8-15.2)  gm/dl


 


Hct     (35.5-45.6)  %


 


Monocytes % (Manual)     (0.0-7.3)  %


 


Monocytes # (Manual)     (0.0-0.8)  K/mm3


 


Sodium     (137-145)  mmol/L


 


Chloride     ()  mmol/L


 


Carbon Dioxide     (22-30)  mmol/L


 


BUN     (9-20)  mg/dL


 


Creatinine     (0.8-1.5)  mg/dL


 


Glucose     ()  mg/dL


 


Lactic Acid  4.60 H*  3.90 H*  4.00 H*  (0.7-2.0)  mmol/L


 


AST     (5-40)  units/L


 


Total Creatine Kinase     ()  units/L


 


Albumin     (3.9-5)  g/dL


 


Lipase     (13-60)  units/L


 


Urine WBC (Auto)     (0.0-6.0)  /HPF














  10/22/19 10/22/19 Range/Units





  20:35 Unknown 


 


RBC    (3.65-5.03)  M/mm3


 


Hgb    (11.8-15.2)  gm/dl


 


Hct    (35.5-45.6)  %


 


Monocytes % (Manual)    (0.0-7.3)  %


 


Monocytes # (Manual)    (0.0-0.8)  K/mm3


 


Sodium    (137-145)  mmol/L


 


Chloride    ()  mmol/L


 


Carbon Dioxide    (22-30)  mmol/L


 


BUN    (9-20)  mg/dL


 


Creatinine    (0.8-1.5)  mg/dL


 


Glucose    ()  mg/dL


 


Lactic Acid  4.20 H*   (0.7-2.0)  mmol/L


 


AST    (5-40)  units/L


 


Total Creatine Kinase    ()  units/L


 


Albumin    (3.9-5)  g/dL


 


Lipase    (13-60)  units/L


 


Urine WBC (Auto)   10.0 H  (0.0-6.0)  /HPF














- Imaging and Cardiology


EKG: image reviewed


Chest x-ray: report reviewed





Assessment and Plan


Assessment


Renal failure, acute


Gastroenteritis, viral vs HIV related


Urinary Tract infection


HIV





Plan


Admit to medicine


Start IV fluids, follow stool cultures, check C. difficile


Consult ID, the Levaquin, DVT prophylaxis

## 2019-10-22 NOTE — CAT SCAN REPORT
CT ABDOMEN AND PELVIS WITHOUT CONTRAST



INDICATION / CLINICAL INFORMATION:

abdominal pain, worse in LLQ,diarrhea, HIV.



TECHNIQUE:

Axial CT images were obtained through the abdomen and pelvis without IV contrast.  All CT scans at [facility] location are performed using CT dose reduction for ALARA by means of automated exposure control. 



COMPARISON:

None available.



FINDINGS:



Lack of oral and IV contrast limits diagnostic accuracy in patient with minimum intra-abdominal fat





LOWER CHEST: No significant abnormality.

LIVER: No significant abnormality.

GALLBLADDER: No significant abnormality.  

BILE DUCTS: No significant abnormality.

PANCREAS: No significant abnormality.

SPLEEN: No significant abnormality.

ADRENALS: No significant abnormality.

RIGHT KIDNEY and URETER: No significant abnormality.

LEFT KIDNEY and URETER: Several punctate calcifications are present left kidney



STOMACH and SMALL BOWEL: No significant abnormality. 

COLON: No significant abnormality. 

APPENDIX: No significant abnormality.  

PERITONEUM: No free fluid. No free air. No fluid collection.

LYMPH NODES: No significant adenopathy.

AORTA and ARTERIES: No significant abnormality. 

IVC and VEINS: No significant abnormality.



URINARY BLADDER: No significant abnormality.

REPRODUCTIVE ORGANS: No significant abnormality.



ADDITIONAL FINDINGS: None.



SKELETAL SYSTEM: No significant abnormality.



IMPRESSION:

1. Left nephrolithiasis



Signer Name: Milton Damon MD 

Signed: 10/22/2019 6:25 PM

 Workstation Name: [id number]

## 2019-10-22 NOTE — EVENT NOTE
Date of service: 10/22/19


Face to Face: 








This is a 33-year-old gentleman, not known to this provider previously, recently

moved here from [place], prior to that was in [place].  Recently started new 

antiviral medication; biktarvy


Does not know CD4 count, he does not know viral load.  Presents with 4-5 days of

diffuse abdominal pain, and profuse diarrhea.


He is found to be tachycardic with a lactic acidosis.  Screening laboratory 

studies ordered, CT scan abdomen pelvis ordered, requested consultation with 

infectious disease for further recommendations.  Uncertain if this is a 

medication side effect, or related to patient's underlying HIV status.  The 

patient does not know if he's ever had in age defining illness or opportunistic 

infection.  He made no comment about recent antibiotic use to this provider.





Anticipate admission for lactic acidosis and tachycardia.


                                   Vital Signs











  10/22/19 10/22/19





  14:09 16:00


 


Temperature 97.5 F L 


 


Pulse Rate 138 H 


 


Respiratory 18 18





Rate  


 


Blood Pressure 114/77 


 


O2 Sat by Pulse 100 97





Oximetry  








                                   Lab Results











  10/22/19 10/22/19 10/22/19 Range/Units





  14:24 14:24 14:24 


 


WBC  10.6    (4.5-11.0)  K/mm3


 


RBC  5.74 H    (3.65-5.03)  M/mm3


 


Hgb  15.9 H    (11.8-15.2)  gm/dl


 


Hct  48.6 H    (35.5-45.6)  %


 


MCV  85    (84-94)  fl


 


MCH  28    (28-32)  pg


 


MCHC  33    (32-34)  %


 


RDW  15.0    (13.2-15.2)  %


 


Plt Count  193    (140-440)  K/mm3


 


Add Manual Diff  Complete    


 


Total Counted  100    


 


Seg Neuts % (Manual)  63.0    (40.0-70.0)  %


 


Band Neutrophils %  0    %


 


Lymphocytes % (Manual)  25.0    (13.4-35.0)  %


 


Reactive Lymphs % (Man)  0    %


 


Monocytes % (Manual)  12.0 H    (0.0-7.3)  %


 


Eosinophils % (Manual)  0    (0.0-4.3)  %


 


Basophils % (Manual)  0    (0.0-1.8)  %


 


Metamyelocytes %  0    %


 


Myelocytes %  0    %


 


Promyelocytes %  0    %


 


Blast Cells %  0    %


 


Nucleated RBC %  Not Reportable    


 


Seg Neutrophils # Man  6.7    (1.8-7.7)  K/mm3


 


Band Neutrophils #  0.0    K/mm3


 


Lymphocytes # (Manual)  2.7    (1.2-5.4)  K/mm3


 


Abs React Lymphs (Man)  0.0    K/mm3


 


Monocytes # (Manual)  1.3 H    (0.0-0.8)  K/mm3


 


Eosinophils # (Manual)  0.0    (0.0-0.4)  K/mm3


 


Basophils # (Manual)  0.0    (0.0-0.1)  K/mm3


 


Metamyelocytes #  0.0    K/mm3


 


Myelocytes #  0.0    K/mm3


 


Promyelocytes #  0.0    K/mm3


 


Blast Cells #  0.0    K/mm3


 


WBC Morphology  Not Reportable    


 


Hypersegmented Neuts  Not Reportable    


 


Hyposegmented Neuts  Not Reportable    


 


Hypogranular Neuts  Not Reportable    


 


Smudge Cells  Not Reportable    


 


Toxic Granulation  Not Reportable    


 


Toxic Vacuolation  Not Reportable    


 


Dohle Bodies  Not Reportable    


 


Pelger-Huet Anomaly  Not Reportable    


 


Robert Rods  Not Reportable    


 


Platelet Estimate  Consistent w auto    


 


Clumped Platelets  Not Reportable    


 


Plt Clumps, EDTA  Not Reportable    


 


Large Platelets  Not Reportable    


 


Giant Platelets  Not Reportable    


 


Platelet Satelliting  Not Reportable    


 


Plt Morphology Comment  Not Reportable    


 


RBC Morphology  Not Reportable    


 


Dimorphic RBCs  Not Reportable    


 


Polychromasia  Not Reportable    


 


Hypochromasia  Not Reportable    


 


Poikilocytosis  1+    


 


Anisocytosis  1+    


 


Microcytosis  Not Reportable    


 


Macrocytosis  Not Reportable    


 


Spherocytes  Not Reportable    


 


Pappenheimer Bodies  Not Reportable    


 


Sickle Cells  Not Reportable    


 


Target Cells  Few    


 


Tear Drop Cells  Not Reportable    


 


Ovalocytes  Rare    


 


Helmet Cells  Not Reportable    


 


[place] Bodies  Not Reportable    


 


C[place]ot Rings  Not Reportable    


 


[place] Cells  Not Reportable    


 


Bite Cells  Not Reportable    


 


Crenated Cell  Not Reportable    


 


Elliptocytes  Not Reportable    


 


Acanthocytes (Spur)  Not Reportable    


 


Rouleaux  Not Reportable    


 


Hemoglobin C Crystals  Not Reportable    


 


Schistocytes  Not Reportable    


 


Malaria parasites  Not Reportable    


 


Brandon Bodies  Not Reportable    


 


Hem Pathologist Commnt  No    


 


PT   12.7   (12.2-14.9)  Sec.


 


INR   0.98   (0.87-1.13)  


 


VBG pH     (7.320-7.420)  


 


Lactic Acid    4.80 H*  (0.7-2.0)  mmol/L


 


Influenza A (Rapid)     (Negative)  


 


Influenza B (Rapid)     (Negative)  














  10/22/19 10/22/19 Range/Units





  14:24 16:16 


 


WBC    (4.5-11.0)  K/mm3


 


RBC    (3.65-5.03)  M/mm3


 


Hgb    (11.8-15.2)  gm/dl


 


Hct    (35.5-45.6)  %


 


MCV    (84-94)  fl


 


MCH    (28-32)  pg


 


MCHC    (32-34)  %


 


RDW    (13.2-15.2)  %


 


Plt Count    (140-440)  K/mm3


 


Add Manual Diff    


 


Total Counted    


 


Seg Neuts % (Manual)    (40.0-70.0)  %


 


Band Neutrophils %    %


 


Lymphocytes % (Manual)    (13.4-35.0)  %


 


Reactive Lymphs % (Man)    %


 


Monocytes % (Manual)    (0.0-7.3)  %


 


Eosinophils % (Manual)    (0.0-4.3)  %


 


Basophils % (Manual)    (0.0-1.8)  %


 


Metamyelocytes %    %


 


Myelocytes %    %


 


Promyelocytes %    %


 


Blast Cells %    %


 


Nucleated RBC %    


 


Seg Neutrophils # Man    (1.8-7.7)  K/mm3


 


Band Neutrophils #    K/mm3


 


Lymphocytes # (Manual)    (1.2-5.4)  K/mm3


 


Abs React Lymphs (Man)    K/mm3


 


Monocytes # (Manual)    (0.0-0.8)  K/mm3


 


Eosinophils # (Manual)    (0.0-0.4)  K/mm3


 


Basophils # (Manual)    (0.0-0.1)  K/mm3


 


Metamyelocytes #    K/mm3


 


Myelocytes #    K/mm3


 


Promyelocytes #    K/mm3


 


Blast Cells #    K/mm3


 


WBC Morphology    


 


Hypersegmented Neuts    


 


Hyposegmented Neuts    


 


Hypogranular Neuts    


 


Smudge Cells    


 


Toxic Granulation    


 


Toxic Vacuolation    


 


Dohle Bodies    


 


Pelger-Huet Anomaly    


 


Robert Rods    


 


Platelet Estimate    


 


Clumped Platelets    


 


Plt Clumps, EDTA    


 


Large Platelets    


 


Giant Platelets    


 


Platelet Satelliting    


 


Plt Morphology Comment    


 


RBC Morphology    


 


Dimorphic RBCs    


 


Polychromasia    


 


Hypochromasia    


 


Poikilocytosis    


 


Anisocytosis    


 


Microcytosis    


 


Macrocytosis    


 


Spherocytes    


 


Pappenheimer Bodies    


 


Sickle Cells    


 


Target Cells    


 


Tear Drop Cells    


 


Ovalocytes    


 


Helmet Cells    


 


[place] Bodies    


 


Cabot Rings    


 


[place] Cells    


 


Bite Cells    


 


Crenated Cell    


 


Elliptocytes    


 


Acanthocytes (Spur)    


 


Rouleaux    


 


Hemoglobin C Crystals    


 


Schistocytes    


 


Malaria parasites    


 


Brandon Bodies    


 


Hem Pathologist Commnt    


 


PT    (12.2-14.9)  Sec.


 


INR    (0.87-1.13)  


 


VBG pH  7.383   (7.320-7.420)  


 


Lactic Acid    (0.7-2.0)  mmol/L


 


Influenza A (Rapid)   Negative  (Negative)  


 


Influenza B (Rapid)   Negative  (Negative)

## 2019-10-23 LAB
BAND NEUTROPHILS # (MANUAL): 0.1 K/MM3
BUN SERPL-MCNC: 21 MG/DL (ref 9–20)
BUN/CREAT SERPL: 18 %
CALCIUM SERPL-MCNC: 7.9 MG/DL (ref 8.4–10.2)
DOHLE BOD BLD QL SMEAR: (no result)
HCT VFR BLD CALC: 38.3 % (ref 35.5–45.6)
HEMOLYSIS INDEX: 28
HGB BLD-MCNC: 12.7 GM/DL (ref 11.8–15.2)
MCHC RBC AUTO-ENTMCNC: 33 % (ref 32–34)
MCV RBC AUTO: 83 FL (ref 84–94)
MYELOCYTES # (MANUAL): 0 K/MM3
PLATELET # BLD: 165 K/MM3 (ref 140–440)
PROMYELOCYTES # (MANUAL): 0 K/MM3
RBC # BLD AUTO: 4.6 M/MM3 (ref 3.65–5.03)
TOTAL CELLS COUNTED BLD: 100

## 2019-10-23 RX ADMIN — Medication SCH ML: at 10:49

## 2019-10-23 RX ADMIN — TENOFOVIR DISPROXIL FUMARATE SCH MG: 300 TABLET ORAL at 19:49

## 2019-10-23 RX ADMIN — EMTRICITABINE SCH MG: 200 CAPSULE ORAL at 19:49

## 2019-10-23 RX ADMIN — SODIUM CHLORIDE SCH MLS/HR: 0.9 INJECTION, SOLUTION INTRAVENOUS at 00:30

## 2019-10-23 RX ADMIN — SODIUM CHLORIDE SCH MLS/HR: 0.9 INJECTION, SOLUTION INTRAVENOUS at 13:46

## 2019-10-23 RX ADMIN — DOLUTEGRAVIR SODIUM SCH MG: 50 TABLET, FILM COATED ORAL at 19:49

## 2019-10-23 NOTE — PROGRESS NOTE
Assessment and Plan


Assessment and plan: 


Acute kidney injury due to vasomotor Nephropathy


Acute Gastroenteritis, viral vs HIV related


Urinary Tract infection


HIV infection


Hyponatremia


Lactic acidosis





Plan


Admitted to [facility]


Started IV fluids, follow stool cultures, check C. difficile


Consult ID, the Levaquin, DVT prophylaxis











History


Interval history: 


Abd pain, nausea , vomiting, diarrhea








Hospitalist Physical





- Physical exam


Narrative exam: 


Gen: Not in acute distress, lying in bed,


HEENT: Normocephalic, atraumatic


Neck: supple, no JVD


Heart: S1 and S2 reg, no murmurs, rubs or gallop


Lungs: Clear to auscultation, no rhonchi, no wheeze


Abd: soft, mild tender, no rebound tenderness, non distended, normal BS, 


Ext: No edema, no clubbing, no cyanosis 


Neuro: Awake, alert, oriented X 3, no focal neurological signs











- Constitutional


Vitals: 


                                        











Temp Pulse Resp BP Pulse Ox


 


 98.5 F   122 H  18   95/59   100 


 


 10/23/19 11:34  10/23/19 11:34  10/23/19 11:34  10/23/19 11:34  10/23/19 11:34














Results





- Labs


CBC & Chem 7: 


                                 10/23/19 06:42





                                 10/23/19 06:42


Labs: 


                             Laboratory Last Values











WBC  5.0 K/mm3 (4.5-11.0)   10/23/19  06:42    


 


RBC  4.60 M/mm3 (3.65-5.03)   10/23/19  06:42    


 


Hgb  12.7 gm/dl (11.8-15.2)  D 10/23/19  06:42    


 


Hct  38.3 % (35.5-45.6)  D 10/23/19  06:42    


 


MCV  83 fl (84-94)  L  10/23/19  06:42    


 


MCH  28 pg (28-32)   10/23/19  06:42    


 


MCHC  33 % (32-34)   10/23/19  06:42    


 


RDW  14.7 % (13.2-15.2)   10/23/19  06:42    


 


Plt Count  165 K/mm3 (140-440)   10/23/19  06:42    


 


Mono % (Auto)  Np   10/23/19  06:42    


 


Add Manual Diff  Complete   10/23/19  06:42    


 


Total Counted  100   10/23/19  06:42    


 


Seg Neuts % (Manual)  61.0 % (40.0-70.0)   10/23/19  06:42    


 


Band Neutrophils %  1.0 %  10/23/19  06:42    


 


Lymphocytes % (Manual)  10.0 % (13.4-35.0)  L  10/23/19  06:42    


 


Reactive Lymphs % (Man)  1.0 %  10/23/19  06:42    


 


Monocytes % (Manual)  26.0 % (0.0-7.3)  H  10/23/19  06:42    


 


Eosinophils % (Manual)  1.0 % (0.0-4.3)   10/23/19  06:42    


 


Basophils % (Manual)  0 % (0.0-1.8)   10/23/19  06:42    


 


Metamyelocytes %  0 %  10/23/19  06:42    


 


Myelocytes %  0 %  10/23/19  06:42    


 


Promyelocytes %  0 %  10/23/19  06:42    


 


Blast Cells %  0 %  10/23/19  06:42    


 


Nucleated RBC %  Not Reportable   10/23/19  06:42    


 


Seg Neutrophils # Man  3.1 K/mm3 (1.8-7.7)   10/23/19  06:42    


 


Band Neutrophils #  0.1 K/mm3  10/23/19  06:42    


 


Lymphocytes # (Manual)  0.5 K/mm3 (1.2-5.4)  L  10/23/19  06:42    


 


Abs React Lymphs (Man)  0.1 K/mm3  10/23/19  06:42    


 


Monocytes # (Manual)  1.3 K/mm3 (0.0-0.8)  H  10/23/19  06:42    


 


Eosinophils # (Manual)  0.1 K/mm3 (0.0-0.4)   10/23/19  06:42    


 


Basophils # (Manual)  0.0 K/mm3 (0.0-0.1)   10/23/19  06:42    


 


Metamyelocytes #  0.0 K/mm3  10/23/19  06:42    


 


Myelocytes #  0.0 K/mm3  10/23/19  06:42    


 


Promyelocytes #  0.0 K/mm3  10/23/19  06:42    


 


Blast Cells #  0.0 K/mm3  10/23/19  06:42    


 


WBC Morphology  Not Reportable   10/23/19  06:42    


 


Hypersegmented Neuts  Not Reportable   10/23/19  06:42    


 


Hyposegmented Neuts  Not Reportable   10/23/19  06:42    


 


Hypogranular Neuts  Not Reportable   10/23/19  06:42    


 


Smudge Cells  Not Reportable   10/23/19  06:42    


 


Toxic Granulation  Not Reportable   10/23/19  06:42    


 


Toxic Vacuolation  Not Reportable   10/23/19  06:42    


 


Dohle Bodies  2+   10/23/19  06:42    


 


Pelger-Huet Anomaly  Not Reportable   10/23/19  06:42    


 


Robert Rods  Not Reportable   10/23/19  06:42    


 


Platelet Estimate  Consistent w auto   10/23/19  06:42    


 


Clumped Platelets  Not Reportable   10/23/19  06:42    


 


Plt Clumps, EDTA  Not Reportable   10/23/19  06:42    


 


Large Platelets  Not Reportable   10/23/19  06:42    


 


Giant Platelets  Not Reportable   10/23/19  06:42    


 


Platelet Satelliting  Not Reportable   10/23/19  06:42    


 


Plt Morphology Comment  Not Reportable   10/23/19  06:42    


 


RBC Morphology  Not Reportable   10/23/19  06:42    


 


Dimorphic RBCs  Not Reportable   10/23/19  06:42    


 


Polychromasia  Not Reportable   10/23/19  06:42    


 


Hypochromasia  Not Reportable   10/23/19  06:42    


 


Poikilocytosis  Few   10/23/19  06:42    


 


Anisocytosis  Few   10/23/19  06:42    


 


Microcytosis  Not Reportable   10/23/19  06:42    


 


Macrocytosis  Not Reportable   10/23/19  06:42    


 


Spherocytes  Not Reportable   10/23/19  06:42    


 


Pappenheimer Bodies  Not Reportable   10/23/19  06:42    


 


Sickle Cells  Not Reportable   10/23/19  06:42    


 


Target Cells  Rare   10/23/19  06:42    


 


Tear Drop Cells  Not Reportable   10/23/19  06:42    


 


Ovalocytes  Rare   10/23/19  06:42    


 


Helmet Cells  Not Reportable   10/23/19  06:42    


 


Hansen-[place] Bodies  Not Reportable   10/23/19  06:42    


 


Cabot Rings  Not Reportable   10/23/19  06:42    


 


Roula Cells  Not Reportable   10/23/19  06:42    


 


Bite Cells  Not Reportable   10/23/19  06:42    


 


Crenated Cell  Not Reportable   10/23/19  06:42    


 


Elliptocytes  Not Reportable   10/23/19  06:42    


 


Acanthocytes (Spur)  Not Reportable   10/23/19  06:42    


 


Rouleaux  Not Reportable   10/23/19  06:42    


 


Hemoglobin C Crystals  Not Reportable   10/23/19  06:42    


 


Schistocytes  Not Reportable   10/23/19  06:42    


 


Malaria parasites  Not Reportable   10/23/19  06:42    


 


Brandon Bodies  Not Reportable   10/23/19  06:42    


 


Hem Pathologist Commnt  No   10/23/19  06:42    


 


PT  12.7 Sec. (12.2-14.9)   10/22/19  14:24    


 


INR  0.98  (0.87-1.13)   10/22/19  14:24    


 


VBG pH  7.383  (7.320-7.420)   10/22/19  14:24    


 


Sodium  131 mmol/L (137-145)  L  10/23/19  06:42    


 


Potassium  4.0 mmol/L (3.6-5.0)   10/23/19  06:42    


 


Chloride  96.8 mmol/L ()  L  10/23/19  06:42    


 


Carbon Dioxide  18 mmol/L (22-30)  L  10/23/19  06:42    


 


Anion Gap  20 mmol/L  10/23/19  06:42    


 


BUN  21 mg/dL (9-20)  H  10/23/19  06:42    


 


Creatinine  1.2 mg/dL (0.8-1.5)   10/23/19  06:42    


 


Estimated GFR  > 60 ml/min  10/23/19  06:42    


 


BUN/Creatinine Ratio  18 %  10/23/19  06:42    


 


Glucose  100 mg/dL ()   10/23/19  06:42    


 


Lactic Acid  1.90 mmol/L (0.7-2.0)   10/22/19  23:17    


 


Calcium  7.9 mg/dL (8.4-10.2)  L  10/23/19  06:42    


 


Magnesium  1.80 mg/dL (1.7-2.3)   10/22/19  16:34    


 


Total Bilirubin  1.00 mg/dL (0.1-1.2)   10/22/19  14:24    


 


AST  52 units/L (5-40)  H  10/22/19  14:24    


 


ALT  17 units/L (7-56)   10/22/19  14:24    


 


Alkaline Phosphatase  42 units/L ()   10/22/19  14:24    


 


Total Creatine Kinase  191 units/L ()  H  10/22/19  16:34    


 


Total Protein  7.5 g/dL (6.3-8.2)   10/22/19  14:24    


 


Albumin  3.2 g/dL (3.9-5)  L  10/22/19  14:24    


 


Albumin/Globulin Ratio  0.7 %  10/22/19  14:24    


 


Lipase  7 units/L (13-60)  L  10/22/19  16:21    


 


Urine Color  Yellow  (Yellow)   10/22/19  Unknown


 


Urine Turbidity  Clear  (Clear)   10/22/19  Unknown


 


Urine pH  6.0  (5.0-7.0)   10/22/19  Unknown


 


Ur Specific Gravity  1.016  (1.003-1.030)   10/22/19  Unknown


 


Urine Protein  30 mg/dl mg/dL (Negative)   10/22/19  Unknown


 


Urine Glucose (UA)  Neg mg/dL (Negative)   10/22/19  Unknown


 


Urine Ketones  Tr mg/dL (Negative)   10/22/19  Unknown


 


Urine Blood  Mod  (Negative)   10/22/19  Unknown


 


Urine Nitrite  Neg  (Negative)   10/22/19  Unknown


 


Urine Bilirubin  Neg  (Negative)   10/22/19  Unknown


 


Urine Urobilinogen  < 2.0 mg/dL (<2.0)   10/22/19  Unknown


 


Ur Leukocyte Esterase  Tr  (Negative)   10/22/19  Unknown


 


Urine WBC (Auto)  10.0 /HPF (0.0-6.0)  H  10/22/19  Unknown


 


Urine RBC (Auto)  5.0 /HPF (0.0-6.0)   10/22/19  Unknown


 


Influenza A (Rapid)  Negative  (Negative)   10/22/19  16:16    


 


Influenza B (Rapid)  Negative  (Negative)   10/22/19  16:16    














Active Medications





- Current Medications


Current Medications: 














Generic Name Dose Route Start Last Admin





  Trade Name Freq  PRN Reason Stop Dose Admin


 


Acetaminophen  650 mg  10/22/19 21:51 





  Tylenol  PO  





  Q4H PRN  





  Pain MILD(1-3)/Fever >100.5/HA  


 


Enoxaparin Sodium  40 mg  10/24/19 10:00 





  Lovenox  SUB-Q  





  QDAY@1000 JOSSIE  


 


Sodium Chloride  1,000 mls @ 150 mls/hr  10/22/19 22:00  10/23/19 13:46





  Nacl 0.9% 1000 Ml  IV   150 mls/hr





  AS DIRECT JOSSIE   Administration


 


Levofloxacin/Dextrose  750 mg in 150 mls @ 100 mls/hr  10/23/19 22:00 





  Levaquin 750mg/150ml  IV  





  Q24H [facility]  





  Protocol  


 


Miscellaneous Medication  1 each  10/23/19 10:00 





  Bictegrav/Emtricit/Tenofov Ala  PO  





  QDAY [facility]  


 


Morphine Sulfate  2 mg  10/22/19 21:51  10/23/19 00:30





  Morphine  IV   2 mg





  Q4H PRN   Administration





  Pain, Moderate (4-6)  


 


Ondansetron HCl  4 mg  10/22/19 21:51 





  Zofran  IV  





  Q4H PRN  





  Nausea And Vomiting  


 


Sodium Chloride  10 ml  10/22/19 22:00  10/23/19 10:49





  Sodium Chloride Flush Syringe 10 Ml  IV   10 ml





  BID JOSSIE   Administration


 


Sodium Chloride  10 ml  10/22/19 20:54  10/23/19 00:50





  Sodium Chloride Flush Syringe 10 Ml  IV   10 ml





  PRN PRN   Administration





  LINE FLUSH

## 2019-10-23 NOTE — ULTRASOUND REPORT
ULTRASOUND RENAL



INDICATION: renal failure



COMPARISON: CT abdomen and pelvis 10/22/2019. 



FINDINGS:



RIGHT KIDNEY: Size: 10.8 cm. 

Echogenicity: Mildly increased. Cortical thickness: Normal.

Stones: None. 

Hydronephrosis: None. 

Cyst or mass: None.  



LEFT KIDNEY: Size: 12.5 cm. 

Echogenicity: Mildly increased. Cortical thickness: Normal. 

Stones: Several small hyperechoic areas are seen but do not definitely shadow and are not clearly stephen
culi. On recent CT only a single tiny calculus is seen in the mid kidney.. 

Hydronephrosis: None. 

Cyst or mass: None.  



Urinary Bladder: No significant abnormality.

Free Fluid: None.

Additional Findings: None.



IMPRESSION: No acute sonographic abnormality of the kidneys



Signer Name: Herminio Dover MD 

Signed: 10/23/2019 7:31 PM

 Workstation Name: [id number]

## 2019-10-23 NOTE — CONSULTATION
History of Present Illness





- Reason for Consult


Consult date: 10/23/19


acute renal failure





- History of Present Illness





This is 33 year old male presented to the hospital for a chief complaint of 

having diarrhea for several days. Patient sates his diarrhea began after start 

taking Biktarvy for his HIV. On admission, patient's serum creatinine was noted 

to be elevated at 1.7. Baseline serum creatinine unknown. We are being consulted

for management of this patient's Acute Renal Failure.











Past History


Past Medical History: HIV/AIDS


Past Surgical History: No surgical history


Social history: other (HIV)


Family history: no significant family history





Medications and Allergies


                                    Allergies











Allergy/AdvReac Type Severity Reaction Status Date / Time


 


No Known Allergies Allergy   Unverified 10/22/19 14:01











                                Home Medications











 Medication  Instructions  Recorded  Confirmed  Last Taken  Type


 


Bictegrav/Emtricit/Tenofov Ala 1 each PO QDAY 10/22/19 10/22/19 10/18/19 History





[Biktarvy -25 mg (Nf)]     











Active Meds: 


Active Medications





Acetaminophen (Tylenol)  650 mg PO Q4H PRN


   PRN Reason: Pain MILD(1-3)/Fever >100.5/HA


Enoxaparin Sodium (Lovenox)  40 mg SUB-Q QDAY@1000 JOSSIE


Sodium Chloride (Nacl 0.9% 1000 Ml)  1,000 mls @ 150 mls/hr IV AS DIRECT JOSSIE


   Last Admin: 10/23/19 00:30 Dose:  150 mls/hr


   Documented by: 


Levofloxacin/Dextrose (Levaquin 750mg/150ml)  750 mg in 150 mls @ 100 mls/hr IV 

Q24H JOSSIE; Protocol


Miscellaneous Medication (Bictegrav/Emtricit/Tenofov Ala)  1 each PO QDAY JOSSIE


Morphine Sulfate (Morphine)  2 mg IV Q4H PRN


   PRN Reason: Pain, Moderate (4-6)


   Last Admin: 10/23/19 00:30 Dose:  2 mg


   Documented by: 


Ondansetron HCl (Zofran)  4 mg IV Q4H PRN


   PRN Reason: Nausea And Vomiting


Sodium Chloride (Sodium Chloride Flush Syringe 10 Ml)  10 ml IV BID JOSSIE


   Last Admin: 10/22/19 23:04 Dose:  10 ml


   Documented by: 


Sodium Chloride (Sodium Chloride Flush Syringe 10 Ml)  10 ml IV PRN PRN


   PRN Reason: LINE FLUSH


   Last Admin: 10/23/19 00:50 Dose:  10 ml


   Documented by: 











Review of Systems


Constitutional: fatigue, no fever, no chills, no sweats


Ears, nose, mouth and throat: no ear pain, no ear discharge, no tinnitis, no 

decreased hearing, no nose pain, no nasal congestion


Cardiovascular: no chest pain, no orthopnea, no palpitations, no rapid/irregular

heart beat, no edema, no syncope


Respiratory: no cough with sputum, no excessive sputum, no hemoptysis, no 

dyspnea on exertion


Gastrointestinal: diarrhea, no abdominal pain


Genitourinary Male: no hematuria, no flank pain, no discharge, no urinary 

frequency, no urinary hesitancy, no nocturia


Rectal: no pain, no incontinence, no bleeding


Musculoskeletal: no neck stiffness, no neck pain, no shooting arm pain, no arm 

numbness/tingling, no low back pain, no shooting leg pain


Integumentary: no rash, no pruritis, no redness, no sores, no wounds, no 

jaundice


Neurological: no head injury, no transient paralysis, no paralysis, no weakness,

no parathesias, no numbness, no tingling, no seizures


Psychiatric: no anxiety, no memory loss, no change in sleep habits, no sleep 

disturbances, no insomnia, no hypersomnia, no change in appetite, no change in 

libido


Endocrine: no cold intolerance, no heat intolerance, no polyphagia, no excessive

thirst, no polydipsia, no polyuria





Exam





- Vital Signs


Vital signs: 


                                   Vital Signs











Temp Pulse Resp BP Pulse Ox


 


 97.5 F L  138 H  18   114/77   100 


 


 10/22/19 14:09  10/22/19 14:09  10/22/19 14:09  10/22/19 14:09  10/22/19 14:09














- General Appearance


General appearance: well-developed, appears stated age, fatigue


EENT: ATNC, PERRL, hearing intact, vision intact


Neck: Present: neck supple


Respiratory: Decreased Breath Sounds


Heart: regular, S1S2


Gastrointestinal: Present: normoactive bowel sounds


Integumentary: warm and dry


Neurologic: alert and oriented x3


Musculoskeletal: Present: other (No edema)





Results





- Lab Results





                                 10/23/19 06:42





                                 10/23/19 06:42


                             Most recent lab results











Calcium  7.9 mg/dL (8.4-10.2)  L  10/23/19  06:42    


 


Magnesium  1.80 mg/dL (1.7-2.3)   10/22/19  16:34    














Assessment and Plan








Acute Renal Failure likely secondary to Volume Depletion from Diarrhea:


-Renal labs reviewed. Serum creatinine trend down to 1.2 today, from 1.7 

yesterday


-Continue on IV hydration with NS


-Obtain renal ultrasound


-Obtain urine lytes


-Avoid nephrotoxic agents


-Monitor I/O's


-Monitor renal function





HIV:


-Was recently started on Biktarvy outpatiently


-ID consulted








Diarrhea:


-Possible medication induced


-Ruling out C/Diff


-As per primary team

## 2019-10-23 NOTE — CONSULTATION
History of Present Illness





- Reason for Consult


Consult date: 10/23/19


Diarrhea, HIV


Requesting physician: SAJAN CASILLAS





- History of Present Illness





The patient is a 33-year-old male with HIV who recently started taking Biktarvy 

about 1 week ago came to the ER with complaints of diarrhea, upto 10-15 BMs/day 

for the last 4-5 days.  He previously used to be on Triumeq, took it for about a

year and was undetectable however then stopped taking it for about 7 months.  He

follows up for HIV at [facility].  Got IV fluids in ER for tachycardia and 

lactate elevation.  Denies any urinary burning.  Denies any nausea or vomiting. 

Denies fever or chills.  Feeling better today, only had 2 bowel movements today.

 Drinks bottled water and filtered city water.  Denies any well water use.  

Denies any sick contacts.  Denies eating outside meals.





Review of Systems: 


General: no fevers,chills or rigors


HEENT: no new visual disturbance


Respiratory: No cough, sputum, hemoptysis or shortness of breath


Cardiovascular: No chest pain, syncope


Gastrointestinal: No nausea, vomiting. diarrhea slowing down


Genitourinary: No dysuria or hematuria


Musculoskeletal: No new or worsening neck pain or back pain 


Neurologic: No headaches, seizures


Hematologic: No easy bruising or bleeding


Endocrine: No night sweats or acute weight loss


Skin: negative for rash, jaundice


Psychiatric: No suicidal or homicidal ideation





Past History


Past Medical History: HIV/AIDS


Past Surgical History: No surgical history


Social history: other (HIV)


Family history: no significant family history





Medications and Allergies


                                    Allergies











Allergy/AdvReac Type Severity Reaction Status Date / Time


 


No Known Allergies Allergy   Unverified 10/22/19 14:01











                                Home Medications











 Medication  Instructions  Recorded  Confirmed  Last Taken  Type


 


Bictegrav/Emtricit/Tenofov Ala 1 each PO QDAY 10/22/19 10/22/19 10/18/19 History





[Biktarvy -25 mg (Nf)]     











Active Meds: 


Active Medications





Acetaminophen (Tylenol)  650 mg PO Q4H PRN


   PRN Reason: Pain MILD(1-3)/Fever >100.5/HA


Emtricitabine 200 mg/Tenofovir Disoproxil Fumarate 300 mg  0 mg PO QDAY JOSSIE


Enoxaparin Sodium (Lovenox)  40 mg SUB-Q QDAY@1000 JOSSIE


Sodium Chloride (Nacl 0.9% 1000 Ml)  1,000 mls @ 150 mls/hr IV AS DIRECT JOSSIE


   Last Admin: 10/23/19 13:46 Dose:  150 mls/hr


   Documented by: 


Miscellaneous Medication (Bictegrav/Emtricit/Tenofov Ala)  1 each PO QDAY JOSSIE


Morphine Sulfate (Morphine)  2 mg IV Q4H PRN


   PRN Reason: Pain, Moderate (4-6)


   Last Admin: 10/23/19 00:30 Dose:  2 mg


   Documented by: 


Ondansetron HCl (Zofran)  4 mg IV Q4H PRN


   PRN Reason: Nausea And Vomiting


Sodium Chloride (Sodium Chloride Flush Syringe 10 Ml)  10 ml IV BID JOSSIE


   Last Admin: 10/23/19 10:49 Dose:  10 ml


   Documented by: 


Sodium Chloride (Sodium Chloride Flush Syringe 10 Ml)  10 ml IV PRN PRN


   PRN Reason: LINE FLUSH


   Last Admin: 10/23/19 00:50 Dose:  10 ml


   Documented by: 











Physical Examination





- Physical Exam


Narrative exam: 





Physical Exam: 


Constitutional: Alert, cooperative. No acute distress


Head, Ears, Nose: Normocephalic, atraumatic. External ears, nose normal


Eyes: Conjunctivae/corneas clear. No icterus. No ptosis.


Neck: Supple, no meningeal signs


Oral: dentition fair, no thrush


Cardiovascular: S1, S2 normal. 


Respiratory: Good air entry, clear to auscultation bilaterally


GI: Soft, mild diffuse tenderness; bowel sounds normal. No peritoneal signs


Musculoskeletal: No pedal edema, no cyanosis.


Skin: No rash or abscess


Hem/Lymphatic: No palpable cervical or supraclavicular nodes. No lymphangitis


Psych: Mood ok. Affect normal


Neurological: Awake, alert, oriented. No gross abnormality





- Constitutional


Vitals: 


                                   Vital Signs











Temp Pulse Resp BP Pulse Ox


 


 98.5 F   122 H  18   95/59   100 


 


 10/23/19 11:34  10/23/19 11:34  10/23/19 11:34  10/23/19 11:34  10/23/19 11:34








                           Temperature -Last 24 Hours











Temperature                    98.5 F


 


Temperature                    98.1 F


 


Temperature                    98.0 F


 


Temperature                    98.1 F


 


Temperature                    99.7 F


 


Temperature                    98 F

















Results





- Labs


CBC & Chem 7: 


                                 10/23/19 06:42





                                 10/23/19 06:42


Labs: 


                              Abnormal lab results











  10/22/19 10/22/19 10/22/19 Range/Units





  14:24 16:21 16:21 


 


MCV     (84-94)  fl


 


Lymphocytes % (Manual)     (13.4-35.0)  %


 


Monocytes % (Manual)     (0.0-7.3)  %


 


Lymphocytes # (Manual)     (1.2-5.4)  K/mm3


 


Monocytes # (Manual)     (0.0-0.8)  K/mm3


 


Sodium  129 L    (137-145)  mmol/L


 


Chloride  92.0 L    ()  mmol/L


 


Carbon Dioxide  20 L    (22-30)  mmol/L


 


BUN  47 H    (9-20)  mg/dL


 


Creatinine  1.7 H    (0.8-1.5)  mg/dL


 


Glucose  113 H    ()  mg/dL


 


Lactic Acid    4.30 H*  (0.7-2.0)  mmol/L


 


Calcium     (8.4-10.2)  mg/dL


 


AST  52 H    (5-40)  units/L


 


Total Creatine Kinase     ()  units/L


 


Albumin  3.2 L    (3.9-5)  g/dL


 


Lipase   7 L   (13-60)  units/L


 


Urine WBC (Auto)     (0.0-6.0)  /HPF














  10/22/19 10/22/19 10/22/19 Range/Units





  16:34 17:22 19:55 


 


MCV     (84-94)  fl


 


Lymphocytes % (Manual)     (13.4-35.0)  %


 


Monocytes % (Manual)     (0.0-7.3)  %


 


Lymphocytes # (Manual)     (1.2-5.4)  K/mm3


 


Monocytes # (Manual)     (0.0-0.8)  K/mm3


 


Sodium     (137-145)  mmol/L


 


Chloride     ()  mmol/L


 


Carbon Dioxide     (22-30)  mmol/L


 


BUN     (9-20)  mg/dL


 


Creatinine     (0.8-1.5)  mg/dL


 


Glucose     ()  mg/dL


 


Lactic Acid   4.60 H*  3.90 H*  (0.7-2.0)  mmol/L


 


Calcium     (8.4-10.2)  mg/dL


 


AST     (5-40)  units/L


 


Total Creatine Kinase  191 H    ()  units/L


 


Albumin     (3.9-5)  g/dL


 


Lipase     (13-60)  units/L


 


Urine WBC (Auto)     (0.0-6.0)  /HPF














  10/22/19 10/22/19 10/22/19 Range/Units





  19:55 20:35 Unknown 


 


MCV     (84-94)  fl


 


Lymphocytes % (Manual)     (13.4-35.0)  %


 


Monocytes % (Manual)     (0.0-7.3)  %


 


Lymphocytes # (Manual)     (1.2-5.4)  K/mm3


 


Monocytes # (Manual)     (0.0-0.8)  K/mm3


 


Sodium     (137-145)  mmol/L


 


Chloride     ()  mmol/L


 


Carbon Dioxide     (22-30)  mmol/L


 


BUN     (9-20)  mg/dL


 


Creatinine     (0.8-1.5)  mg/dL


 


Glucose     ()  mg/dL


 


Lactic Acid  4.00 H*  4.20 H*   (0.7-2.0)  mmol/L


 


Calcium     (8.4-10.2)  mg/dL


 


AST     (5-40)  units/L


 


Total Creatine Kinase     ()  units/L


 


Albumin     (3.9-5)  g/dL


 


Lipase     (13-60)  units/L


 


Urine WBC (Auto)    10.0 H  (0.0-6.0)  /HPF














  10/23/19 10/23/19 Range/Units





  06:42 06:42 


 


MCV  83 L   (84-94)  fl


 


Lymphocytes % (Manual)  10.0 L   (13.4-35.0)  %


 


Monocytes % (Manual)  26.0 H   (0.0-7.3)  %


 


Lymphocytes # (Manual)  0.5 L   (1.2-5.4)  K/mm3


 


Monocytes # (Manual)  1.3 H   (0.0-0.8)  K/mm3


 


Sodium   131 L  (137-145)  mmol/L


 


Chloride   96.8 L  ()  mmol/L


 


Carbon Dioxide   18 L  (22-30)  mmol/L


 


BUN   21 H  (9-20)  mg/dL


 


Creatinine    (0.8-1.5)  mg/dL


 


Glucose    ()  mg/dL


 


Lactic Acid    (0.7-2.0)  mmol/L


 


Calcium   7.9 L  (8.4-10.2)  mg/dL


 


AST    (5-40)  units/L


 


Total Creatine Kinase    ()  units/L


 


Albumin    (3.9-5)  g/dL


 


Lipase    (13-60)  units/L


 


Urine WBC (Auto)    (0.0-6.0)  /HPF














- Imaging and Cardiology


CT scan - abdomen: report reviewed, image reviewed (no colitis)





Assessment and Plan





Cultures:


10/22/2019 blood culture: No growth


10/22/2019 stool cryptosporidium and Giardia: Negative


10/22/2019 urine culture: No growth





A/P:


33-year-old male with HIV who recently started taking Biktarvy about 1 week ago 

came to the ER with complaints of diarrhea:





1) Acute diarrhea: Reactive postoperative negative.  Unlikely to be related to 

Biktarvy.  Patient has no fever or chills, no leucocytosis/leukopenia.  Low 

suspicion for disseminated MAC.  CT abdomen unremarkable for colitis.  Suspect 

viral etiology.





2) HIV: continue ARVs. 





3) LORI: from dehydration.





Recs:


Levofloxacin discontinued


Continue supportive care


HIV meds ordered: Truvada plus Tivicay since Biktarvy is non formulary


Stool culture and stool for C.diff ordered


Once diarrhea better and can tolerate food, OK to discharge from ID standpoint





Dary Wooten MD, FACP


[facility] ([facility])


C: [phone]


O: [phone]


F: [phone]

## 2019-10-24 VITALS — SYSTOLIC BLOOD PRESSURE: 96 MMHG | DIASTOLIC BLOOD PRESSURE: 62 MMHG

## 2019-10-24 LAB
BUN SERPL-MCNC: 10 MG/DL (ref 9–20)
BUN/CREAT SERPL: 11 %
CALCIUM SERPL-MCNC: 7.8 MG/DL (ref 8.4–10.2)
CREATININE,URINE: 71.7 MG/DL (ref 0.1–20)
HEMOLYSIS INDEX: 13

## 2019-10-24 RX ADMIN — SODIUM CHLORIDE SCH MLS/HR: 0.9 INJECTION, SOLUTION INTRAVENOUS at 11:53

## 2019-10-24 RX ADMIN — Medication SCH: at 13:15

## 2019-10-24 RX ADMIN — SODIUM CHLORIDE SCH MLS/HR: 0.9 INJECTION, SOLUTION INTRAVENOUS at 05:09

## 2019-10-24 RX ADMIN — EMTRICITABINE SCH MG: 200 CAPSULE ORAL at 10:04

## 2019-10-24 RX ADMIN — Medication SCH ML: at 05:08

## 2019-10-24 RX ADMIN — TENOFOVIR DISPROXIL FUMARATE SCH MG: 300 TABLET ORAL at 10:03

## 2019-10-24 RX ADMIN — DOLUTEGRAVIR SODIUM SCH MG: 50 TABLET, FILM COATED ORAL at 10:04

## 2019-10-24 RX ADMIN — Medication SCH: at 10:09

## 2019-10-24 NOTE — PROGRESS NOTE
Assessment and Plan





Acute Renal Failure likely secondary to Volume Depletion from Diarrhea:


-resolved


-Avoid nephrotoxic agents


-Monitor I/O's


-Monitor renal function





HIV:


-Was recently started on Biktarvy outpatiently


-ID consulted








Diarrhea:


-Possible medication induced


-Ruling out C/Diff


-As per primary team








will sign off, please reconsult if needed





Pedro Gomez MD


[phone]











Subjective


Date of service: 10/24/19


Principal diagnosis: LORI


Interval history: 





tired but comfortable





Objective





- Vital Signs


Vital signs: 


                               Vital Signs - 12hr











  10/24/19 10/24/19 10/24/19





  00:00 04:26 08:14


 


Temperature 98.4 F 98.5 F 98.7 F


 


Pulse Rate 93 H 91 H 73


 


Respiratory 18 18 16





Rate   


 


Blood Pressure 90/60 97/61 101/65


 


O2 Sat by Pulse 100 100 100





Oximetry   














  10/24/19





  08:36


 


Temperature 


 


Pulse Rate 102 H


 


Respiratory 





Rate 


 


Blood Pressure 


 


O2 Sat by Pulse 





Oximetry 














- General Appearance


General appearance: well-developed, well-nourished


EENT: ATNC, PERRL, mucous membranes moist


Neck: no JVD


Respiratory: Present: Clear to Ascultation


Cardiology: regular, S1S2


Gastrointestinal: normoactive bowel sounds, no tenderness, no distended


Integumentary: no rash, warm and dry


Neurologic: no focal deficit, no asterixis, alert and oriented x3


Musculoskeletal: other (no edema in BLE)


Psychiatric: mood/affect appropriate, cooperative





- Lab





                                 10/23/19 06:42





                                 10/24/19 05:02


                             Most recent lab results











Calcium  7.8 mg/dL (8.4-10.2)  L  10/24/19  05:02    


 


Magnesium  1.80 mg/dL (1.7-2.3)   10/22/19  16:34    


 


Urine Creatinine  71.7 mg/dL (0.1-20.0)  H  10/23/19  23:52    


 


Urine Sodium  28 mmol/L  10/23/19  23:52    


 


Urine Total Protein  28 mg/dL (5-11.8)  H  10/23/19  23:52    














Medications & Allergies





- Medications


Allergies/Adverse Reactions: 


                                    Allergies





No Known Allergies Allergy (Unverified 10/22/19 14:01)


   








Home Medications: 


                                Home Medications











 Medication  Instructions  Recorded  Confirmed  Last Taken  Type


 


Bictegrav/Emtricit/Tenofov Ala 1 each PO QDAY 10/22/19 10/22/19 10/18/19 History





[Biktarvy -25 mg (Nf)]     











Active Medications: 














Generic Name Dose Route Start Last Admin





  Trade Name Freq  PRN Reason Stop Dose Admin


 


Acetaminophen  650 mg  10/22/19 21:51 





  Tylenol  PO  





  Q4H PRN  





  Pain MILD(1-3)/Fever >100.5/HA  


 


Emtricitabine  200 mg  10/23/19 18:00  10/24/19 10:04





  Emtriva  PO   200 mg





  QDAY JOSSIE   Administration


 


Enoxaparin Sodium  40 mg  10/24/19 10:00  10/24/19 10:03





  Lovenox  SUB-Q   40 mg





  QDAY@1000 JOSSIE   Administration


 


Sodium Chloride  1,000 mls @ 150 mls/hr  10/22/19 22:00  10/24/19 11:53





  Nacl 0.9% 1000 Ml  IV   150 mls/hr





  AS DIRECT JOSSIE   Administration


 


Miscellaneous Medication  1 each  10/23/19 10:00 





  Bictegrav/Emtricit/Tenofov Ala  PO  





  QDAY JOSSIE  


 


Morphine Sulfate  2 mg  10/22/19 21:51  10/23/19 00:30





  Morphine  IV   2 mg





  Q4H PRN   Administration





  Pain, Moderate (4-6)  


 


Ondansetron HCl  4 mg  10/22/19 21:51 





  Zofran  IV  





  Q4H PRN  





  Nausea And Vomiting  


 


Pneumococcal Polyvalent Vaccine  0.5 ml  10/25/19 12:00 





  Pneumovax 23  IM  10/25/19 12:01 





  .ONCE ONE  


 


Sodium Chloride  10 ml  10/22/19 22:00  10/24/19 10:09





  Sodium Chloride Flush Syringe 10 Ml  IV   Not Given





  BID JOSSIE  


 


Sodium Chloride  10 ml  10/22/19 20:54  10/23/19 00:50





  Sodium Chloride Flush Syringe 10 Ml  IV   10 ml





  PRN PRN   Administration





  LINE FLUSH  


 


Tenofovir Disoproxil Fumarate  300 mg  10/23/19 18:00  10/24/19 10:03





  Viread  PO   300 mg





  QDAY JOSSIE   Administration

## 2019-10-24 NOTE — DISCHARGE SUMMARY
Providers





- Providers


Date of Admission: 


10/22/19 20:54





Date of discharge: 10/24/19


Attending physician: 


SAJAN OBRIEN





                                        





10/22/19 16:49


Consult to Physician [CONS] Urgent 


   Comment: 


   Consulting Provider: ARTUR CHINO


   Physician Instructions: 


   Reason For Exam: hiv on haart diarrhea abd pain sirs





10/22/19 20:57


Consult to Physician [CONS] Routine 


   Comment: 


   Consulting Provider: JONAS POWER


   Physician Instructions: 


   Reason For Exam: LORI, hx HIV











Primary care physician: 


PRIMARY CARE MD








Hospitalization


Condition: Fair


Disposition: DC-01 TO HOME OR SELFCARE





Exam





- Constitutional


Vitals: 


                                        











Temp Pulse Resp BP Pulse Ox


 


 98.7 F   97 H  16   96/62   95 


 


 10/24/19 08:14  10/24/19 12:01  10/24/19 08:14  10/24/19 12:01  10/24/19 12:01














Plan


Activity: advance as tolerated


Diet: regular


Plan of Treatment: 


1.Follow up with PCP or [facility] in 1 week.


2.Follow up with ID Physician as scheduled


Follow up with: 


PRIMARY CARE,MD [Primary Care Provider] - 3-5 Days

## 2019-10-24 NOTE — PROGRESS NOTE
Assessment and Plan





Cultures:


10/22/2019 blood culture: No growth


10/22/2019 stool cryptosporidium and Giardia: Negative


10/22/2019 urine culture: No growth


Stool culture pending, C.difficile negative





A/P:


33-year-old male with HIV who recently started taking Biktarvy about 1 week ago 

came to the ER with complaints of diarrhea:





1) Acute diarrhea: cryptosporidium and Giardia negative.  Unlikely to be related

to Biktarvy.  Patient has no fever or chills, no leucocytosis/leukopenia.  Low 

suspicion for disseminated MAC.  CT abdomen unremarkable for colitis. Resolving.





2) HIV: continue ARVs. 





3) LORI: from dehydration.





Recs:


stool culture is pending, C.diff is negative


Diarrhea almost resolved, seems to be tolerating his meals. Stable for d/c from 

ID standpoint


Resume Biktarvy upon discharge





d/w Dr. Chavez and patient   





Dary Wooten MD, [facility]


[facility] ([facility])


C: [phone]


O: [phone]


F: [phone]





Subjective


Date of service: 10/24/19


Principal diagnosis: LORI


Interval history: 





Diarrhea much improved. No abdominal pain. Eating his lunch without issues. No 

fever.





Objective





- Exam


Narrative Exam: 





Physical Exam: 


Constitutional: Alert, cooperative. No acute distress


Head, Ears, Nose: Normocephalic, atraumatic. External ears, nose normal


Eyes: Conjunctivae/corneas clear. No icterus. No ptosis.


Neck: Supple, no meningeal signs


Oral: dentition fair, no thrush


Cardiovascular: S1, S2 normal. 


Respiratory: Good air entry, clear to auscultation bilaterally


GI: Soft, non tender; bowel sounds normal. No peritoneal signs


Musculoskeletal: No pedal edema, no cyanosis.


Skin: No rash or abscess


Hem/Lymphatic: No palpable cervical or supraclavicular nodes. No lymphangitis


Psych: Mood ok. Affect normal


Neurological: Awake, alert, oriented. No gross abnormality





- Constitutional


Vitals: 


                                   Vital Signs











Temp Pulse Resp BP Pulse Ox


 


 98.7 F   97 H  16   96/62   95 


 


 10/24/19 08:14  10/24/19 12:01  10/24/19 08:14  10/24/19 12:01  10/24/19 12:01








                           Temperature -Last 24 Hours











Temperature                    98.7 F


 


Temperature                    98.5 F


 


Temperature                    98.4 F


 


Temperature                    98.7 F


 


Temperature                    97.7 F

















- Labs


CBC & Chem 7: 


                                 10/23/19 06:42





                                 10/24/19 05:02


Labs: 


                              Abnormal lab results











  10/23/19 10/24/19 Range/Units





  23:52 05:02 


 


Sodium   135 L  (137-145)  mmol/L


 


Carbon Dioxide   20 L  (22-30)  mmol/L


 


Calcium   7.8 L  (8.4-10.2)  mg/dL


 


Urine Creatinine  71.7 H   (0.1-20.0)  mg/dL


 


Urine Total Protein  28 H   (5-11.8)  mg/dL